# Patient Record
Sex: FEMALE | Race: WHITE | NOT HISPANIC OR LATINO | URBAN - METROPOLITAN AREA
[De-identification: names, ages, dates, MRNs, and addresses within clinical notes are randomized per-mention and may not be internally consistent; named-entity substitution may affect disease eponyms.]

---

## 2017-05-14 ENCOUNTER — EMERGENCY (EMERGENCY)
Facility: HOSPITAL | Age: 82
LOS: 1 days | Discharge: LEFT BEFORE DISCHARGE | End: 2017-05-14
Attending: EMERGENCY MEDICINE | Admitting: EMERGENCY MEDICINE
Payer: MEDICARE

## 2017-05-14 VITALS
DIASTOLIC BLOOD PRESSURE: 55 MMHG | RESPIRATION RATE: 16 BRPM | OXYGEN SATURATION: 98 % | HEART RATE: 61 BPM | SYSTOLIC BLOOD PRESSURE: 114 MMHG | TEMPERATURE: 97 F

## 2017-05-14 VITALS
SYSTOLIC BLOOD PRESSURE: 142 MMHG | OXYGEN SATURATION: 100 % | HEART RATE: 64 BPM | TEMPERATURE: 97 F | RESPIRATION RATE: 18 BRPM | DIASTOLIC BLOOD PRESSURE: 70 MMHG

## 2017-05-14 LAB
ALBUMIN SERPL ELPH-MCNC: 3.3 G/DL — SIGNIFICANT CHANGE UP (ref 3.3–5)
ALP SERPL-CCNC: 61 U/L — SIGNIFICANT CHANGE UP (ref 40–120)
ALT FLD-CCNC: 28 U/L — SIGNIFICANT CHANGE UP (ref 4–33)
AST SERPL-CCNC: 40 U/L — HIGH (ref 4–32)
BASOPHILS # BLD AUTO: 0 K/UL — SIGNIFICANT CHANGE UP (ref 0–0.2)
BASOPHILS NFR BLD AUTO: 0 % — SIGNIFICANT CHANGE UP (ref 0–2)
BILIRUB SERPL-MCNC: < 0.2 MG/DL — LOW (ref 0.2–1.2)
BUN SERPL-MCNC: 18 MG/DL — SIGNIFICANT CHANGE UP (ref 7–23)
CALCIUM SERPL-MCNC: 7.4 MG/DL — LOW (ref 8.4–10.5)
CHLORIDE SERPL-SCNC: 111 MMOL/L — HIGH (ref 98–107)
CO2 SERPL-SCNC: 17 MMOL/L — LOW (ref 22–31)
CREAT SERPL-MCNC: 0.92 MG/DL — SIGNIFICANT CHANGE UP (ref 0.5–1.3)
EOSINOPHIL # BLD AUTO: 0.06 K/UL — SIGNIFICANT CHANGE UP (ref 0–0.5)
EOSINOPHIL NFR BLD AUTO: 1 % — SIGNIFICANT CHANGE UP (ref 0–6)
GLUCOSE SERPL-MCNC: 115 MG/DL — HIGH (ref 70–99)
HCT VFR BLD CALC: 39 % — SIGNIFICANT CHANGE UP (ref 34.5–45)
HGB BLD-MCNC: 12.7 G/DL — SIGNIFICANT CHANGE UP (ref 11.5–15.5)
IMM GRANULOCYTES NFR BLD AUTO: 0.2 % — SIGNIFICANT CHANGE UP (ref 0–1.5)
INR BLD: 0.98 — SIGNIFICANT CHANGE UP (ref 0.88–1.17)
LYMPHOCYTES # BLD AUTO: 1.16 K/UL — SIGNIFICANT CHANGE UP (ref 1–3.3)
LYMPHOCYTES # BLD AUTO: 19.2 % — SIGNIFICANT CHANGE UP (ref 13–44)
MCHC RBC-ENTMCNC: 28.9 PG — SIGNIFICANT CHANGE UP (ref 27–34)
MCHC RBC-ENTMCNC: 32.6 % — SIGNIFICANT CHANGE UP (ref 32–36)
MCV RBC AUTO: 88.8 FL — SIGNIFICANT CHANGE UP (ref 80–100)
MONOCYTES # BLD AUTO: 0.41 K/UL — SIGNIFICANT CHANGE UP (ref 0–0.9)
MONOCYTES NFR BLD AUTO: 6.8 % — SIGNIFICANT CHANGE UP (ref 2–14)
NEUTROPHILS # BLD AUTO: 4.39 K/UL — SIGNIFICANT CHANGE UP (ref 1.8–7.4)
NEUTROPHILS NFR BLD AUTO: 72.8 % — SIGNIFICANT CHANGE UP (ref 43–77)
PLATELET # BLD AUTO: 197 K/UL — SIGNIFICANT CHANGE UP (ref 150–400)
PMV BLD: 10.3 FL — SIGNIFICANT CHANGE UP (ref 7–13)
POTASSIUM SERPL-MCNC: 3.9 MMOL/L — SIGNIFICANT CHANGE UP (ref 3.5–5.3)
POTASSIUM SERPL-SCNC: 3.9 MMOL/L — SIGNIFICANT CHANGE UP (ref 3.5–5.3)
PROT SERPL-MCNC: 5.7 G/DL — LOW (ref 6–8.3)
PROTHROM AB SERPL-ACNC: 11 SEC — SIGNIFICANT CHANGE UP (ref 9.8–13.1)
RBC # BLD: 4.39 M/UL — SIGNIFICANT CHANGE UP (ref 3.8–5.2)
RBC # FLD: 13.7 % — SIGNIFICANT CHANGE UP (ref 10.3–14.5)
SODIUM SERPL-SCNC: 145 MMOL/L — SIGNIFICANT CHANGE UP (ref 135–145)
TROPONIN T SERPL-MCNC: < 0.06 NG/ML — SIGNIFICANT CHANGE UP (ref 0–0.06)
WBC # BLD: 6.03 K/UL — SIGNIFICANT CHANGE UP (ref 3.8–10.5)
WBC # FLD AUTO: 6.03 K/UL — SIGNIFICANT CHANGE UP (ref 3.8–10.5)

## 2017-05-14 PROCEDURE — 71020: CPT | Mod: 26

## 2017-05-14 PROCEDURE — 99285 EMERGENCY DEPT VISIT HI MDM: CPT

## 2017-05-14 NOTE — ED ADULT TRIAGE NOTE - CHIEF COMPLAINT QUOTE
Patient brought in by EMS  from a friends house. Was having dinner when she felt diaphoretic. LOC for approximately 5 minutes. Patient brought down to ground by family. No injuries obtained and did not hit head. Hypotensive on scene (83/51). FS- 241 18G IV lock to left forearm- received 1200cc NS. PMH- hld, hypothyroid, htn, colitis, CVA (no residual), osteoporosis

## 2017-05-14 NOTE — ED PROVIDER NOTE - MEDICAL DECISION MAKING DETAILS
88 yo F w/ hx CVA on plavix and ASA, HLD, HTN, BIBEMS s/p witnessed syncopal episode. Patient finished eating and felt diaphoretic, was taken to a couch, dropped backwards and slumped on couch, LOC for approximately 5 minutes. Family denied any shaking, urinary incontinence, no head injury. Patient brought down to ground by family. EMS found patient to be Hypotensive on scene (83/51). FS- 241 18G IV lock to left forearm- received 1200cc NS. Pt is not back to baseline. Denies any symptoms.  - Labs, chest x-ray, EKG

## 2017-05-14 NOTE — ED PROVIDER NOTE - ATTENDING CONTRIBUTION TO CARE
87F p/w episode of passing out at home after eating dinner - felt diaphoretic, was led to couch at which time slumped over and LOC x 5min, no shaking or B/B incontinence.  USOH today.  Found to be hypotensive on scene, rx'ed IVF by EMS, now back to baseline, feeling better.  VS:  unremarkable    GEN - NAD; well appearing; A+O x3   HEAD - NC/AT     ENT - PEERL, EOMI, mucous membranes  moist , no discharge      NECK: Neck supple, non-tender without lymphadenopathy, no masses, no JVD  PULM - CTA b/l,  symmetric breath sounds  COR -  normal heart sounds    ABD - , ND, NT, soft, no guarding, no rebound, no masses    BACK - no CVA tenderness, nontender spine     EXTREMS - no edema, no deformity, warm and well perfused    SKIN - no rash or bruising      NEUROLOGIC - alert, CN 2-12 intact, sensation nl, motor 5/5 RUE/LUE/RLE/LLE.      IMP:  87F p/w syncope, found to be hypotensive on scene, now impv with IVF.  No complaints at present, no findings on exam.  Given age and mildly abnormal EKG would admit, however pt adamantly refusing to stay, understands she may drop dead of an arrhythmia, does not want to stay, will follow up with PMD.  Has capacity to make this decision.  Not interested in waiting for a second set of CE/rpt EKG.  Left in care of 2 family members.

## 2017-05-14 NOTE — ED ADULT NURSE NOTE - OBJECTIVE STATEMENT
Alert and oriented x 4. Pt received to spot 22 complaining of syncopal episode. Pt states she feels fine. As per son pt was getting up to stand and fell out onto the couch. Pt denies chest pain, shortness of breath, nausea, vomiting or dizziness. Pt is sinus rhythm on cardiac monitor. Pt ambulates.  IV 18g to left arm from EMS. Labs drawn. VSS. Will continue to monitor. RN facilitator.

## 2017-05-14 NOTE — ED PROVIDER NOTE - PROGRESS NOTE DETAILS
PMD Dr. López [phone], [phone]. was contacted Pt was explained the risk of leaving the hospital after a syncopal episode with an abnormal EKG without a cardiac work-up. Pt is able to make decision for herself and decided to leave. Pt was given lab results to follow up with PMD.

## 2017-05-14 NOTE — ED PROVIDER NOTE - OBJECTIVE STATEMENT
86 yo F w/ hx CVA on plavix and ASA, HLD, HTN, BIBEMS s/p witnessed syncopal episode. Patient finished eating and felt diaphoretic, was taken to a couch, dropped backwards and slumped on couch, LOC for approximately 5 minutes. Family denied any shaking, urinary incontinence, no head injury. Patient brought down to ground by family. EMS found patient to be Hypotensive on scene (83/51). FS- 241 18G IV lock to left forearm- received 1200cc NS. Pt is not back to baseline. Denies any symptoms.

## 2017-06-26 VITALS — WEIGHT: 114 LBS | BODY MASS INDEX: 24.59 KG/M2 | HEIGHT: 57 IN

## 2017-06-26 PROBLEM — R55 SYNCOPE, NEAR: Status: ACTIVE | Noted: 2017-06-26

## 2017-06-26 PROBLEM — K57.90 DIVERTICULOSIS: Status: ACTIVE | Noted: 2017-06-26

## 2017-06-26 PROBLEM — I10 HYPERTENSION, BENIGN: Status: ACTIVE | Noted: 2017-06-26

## 2017-06-26 PROBLEM — E03.9 HYPOTHYROIDISM, ADULT: Status: ACTIVE | Noted: 2017-06-26

## 2017-06-26 PROBLEM — R06.09 DYSPNEA ON EXERTION: Status: ACTIVE | Noted: 2017-06-26

## 2017-06-26 PROBLEM — I63.9 MINI STROKE: Status: RESOLVED | Noted: 2017-06-26 | Resolved: 2017-06-26

## 2017-06-26 PROBLEM — Z00.00 ENCOUNTER FOR PREVENTIVE HEALTH EXAMINATION: Status: ACTIVE | Noted: 2017-06-26

## 2017-06-26 PROBLEM — R73.03 PRE-DIABETES: Status: ACTIVE | Noted: 2017-06-26

## 2017-06-26 RX ORDER — VERAPAMIL HYDROCHLORIDE 240 MG/1
240 TABLET ORAL
Refills: 0 | Status: ACTIVE | COMMUNITY

## 2017-06-26 RX ORDER — CLOPIDOGREL 75 MG/1
75 TABLET, FILM COATED ORAL
Refills: 0 | Status: ACTIVE | COMMUNITY

## 2017-06-26 RX ORDER — PRAVASTATIN SODIUM 40 MG/1
40 TABLET ORAL
Refills: 0 | Status: ACTIVE | COMMUNITY

## 2017-06-28 ENCOUNTER — APPOINTMENT (OUTPATIENT)
Dept: CARDIOLOGY | Facility: CLINIC | Age: 82
End: 2017-06-28

## 2017-06-28 DIAGNOSIS — R06.09 OTHER FORMS OF DYSPNEA: ICD-10-CM

## 2017-06-28 DIAGNOSIS — K57.90 DIVERTICULOSIS OF INTESTINE, PART UNSPECIFIED, W/OUT PERFORATION OR ABSCESS W/OUT BLEEDING: ICD-10-CM

## 2017-06-28 DIAGNOSIS — R73.03 PREDIABETES.: ICD-10-CM

## 2017-06-28 DIAGNOSIS — I63.9 CEREBRAL INFARCTION, UNSPECIFIED: ICD-10-CM

## 2017-06-28 DIAGNOSIS — E03.9 HYPOTHYROIDISM, UNSPECIFIED: ICD-10-CM

## 2017-06-28 DIAGNOSIS — R55 SYNCOPE AND COLLAPSE: ICD-10-CM

## 2017-06-28 DIAGNOSIS — I10 ESSENTIAL (PRIMARY) HYPERTENSION: ICD-10-CM

## 2021-10-27 ENCOUNTER — INPATIENT (INPATIENT)
Facility: HOSPITAL | Age: 86
LOS: 1 days | Discharge: HOME | DRG: 312 | End: 2021-10-29
Attending: INTERNAL MEDICINE | Admitting: INTERNAL MEDICINE
Payer: MEDICARE

## 2021-10-27 VITALS
HEART RATE: 60 BPM | HEIGHT: 65 IN | WEIGHT: 110.01 LBS | TEMPERATURE: 98 F | DIASTOLIC BLOOD PRESSURE: 58 MMHG | RESPIRATION RATE: 16 BRPM | OXYGEN SATURATION: 98 % | SYSTOLIC BLOOD PRESSURE: 124 MMHG

## 2021-10-27 DIAGNOSIS — F03.90 UNSPECIFIED DEMENTIA WITHOUT BEHAVIORAL DISTURBANCE: ICD-10-CM

## 2021-10-27 DIAGNOSIS — E78.5 HYPERLIPIDEMIA, UNSPECIFIED: ICD-10-CM

## 2021-10-27 DIAGNOSIS — R55 SYNCOPE AND COLLAPSE: ICD-10-CM

## 2021-10-27 DIAGNOSIS — Z29.9 ENCOUNTER FOR PROPHYLACTIC MEASURES, UNSPECIFIED: ICD-10-CM

## 2021-10-27 DIAGNOSIS — I10 ESSENTIAL (PRIMARY) HYPERTENSION: ICD-10-CM

## 2021-10-27 DIAGNOSIS — E03.9 HYPOTHYROIDISM, UNSPECIFIED: ICD-10-CM

## 2021-10-27 DIAGNOSIS — I63.9 CEREBRAL INFARCTION, UNSPECIFIED: ICD-10-CM

## 2021-10-27 LAB
ALBUMIN SERPL ELPH-MCNC: 4.3 G/DL — SIGNIFICANT CHANGE UP (ref 3.3–5)
ALP SERPL-CCNC: 61 U/L — SIGNIFICANT CHANGE UP (ref 40–120)
ALT FLD-CCNC: 27 U/L — SIGNIFICANT CHANGE UP (ref 10–45)
ANION GAP SERPL CALC-SCNC: 15 MMOL/L — SIGNIFICANT CHANGE UP (ref 5–17)
APPEARANCE UR: CLEAR — SIGNIFICANT CHANGE UP
AST SERPL-CCNC: 36 U/L — SIGNIFICANT CHANGE UP (ref 10–40)
BACTERIA # UR AUTO: NEGATIVE — SIGNIFICANT CHANGE UP
BASOPHILS # BLD AUTO: 0.01 K/UL — SIGNIFICANT CHANGE UP (ref 0–0.2)
BASOPHILS NFR BLD AUTO: 0.2 % — SIGNIFICANT CHANGE UP (ref 0–2)
BILIRUB SERPL-MCNC: 0.3 MG/DL — SIGNIFICANT CHANGE UP (ref 0.2–1.2)
BILIRUB UR-MCNC: NEGATIVE — SIGNIFICANT CHANGE UP
BUN SERPL-MCNC: 22 MG/DL — SIGNIFICANT CHANGE UP (ref 7–23)
CALCIUM SERPL-MCNC: 9.7 MG/DL — SIGNIFICANT CHANGE UP (ref 8.4–10.5)
CHLORIDE SERPL-SCNC: 102 MMOL/L — SIGNIFICANT CHANGE UP (ref 96–108)
CO2 SERPL-SCNC: 24 MMOL/L — SIGNIFICANT CHANGE UP (ref 22–31)
COLOR SPEC: SIGNIFICANT CHANGE UP
CREAT SERPL-MCNC: 0.79 MG/DL — SIGNIFICANT CHANGE UP (ref 0.5–1.3)
DIFF PNL FLD: NEGATIVE — SIGNIFICANT CHANGE UP
EOSINOPHIL # BLD AUTO: 0.09 K/UL — SIGNIFICANT CHANGE UP (ref 0–0.5)
EOSINOPHIL NFR BLD AUTO: 1.5 % — SIGNIFICANT CHANGE UP (ref 0–6)
EPI CELLS # UR: 0 /HPF — SIGNIFICANT CHANGE UP
GLUCOSE SERPL-MCNC: 122 MG/DL — HIGH (ref 70–99)
GLUCOSE UR QL: NEGATIVE — SIGNIFICANT CHANGE UP
HCT VFR BLD CALC: 41.5 % — SIGNIFICANT CHANGE UP (ref 34.5–45)
HGB BLD-MCNC: 13.4 G/DL — SIGNIFICANT CHANGE UP (ref 11.5–15.5)
HYALINE CASTS # UR AUTO: 1 /LPF — SIGNIFICANT CHANGE UP (ref 0–2)
IMM GRANULOCYTES NFR BLD AUTO: 0.3 % — SIGNIFICANT CHANGE UP (ref 0–1.5)
KETONES UR-MCNC: NEGATIVE — SIGNIFICANT CHANGE UP
LEUKOCYTE ESTERASE UR-ACNC: NEGATIVE — SIGNIFICANT CHANGE UP
LYMPHOCYTES # BLD AUTO: 1.32 K/UL — SIGNIFICANT CHANGE UP (ref 1–3.3)
LYMPHOCYTES # BLD AUTO: 22.4 % — SIGNIFICANT CHANGE UP (ref 13–44)
MCHC RBC-ENTMCNC: 28 PG — SIGNIFICANT CHANGE UP (ref 27–34)
MCHC RBC-ENTMCNC: 32.3 GM/DL — SIGNIFICANT CHANGE UP (ref 32–36)
MCV RBC AUTO: 86.8 FL — SIGNIFICANT CHANGE UP (ref 80–100)
MONOCYTES # BLD AUTO: 0.53 K/UL — SIGNIFICANT CHANGE UP (ref 0–0.9)
MONOCYTES NFR BLD AUTO: 9 % — SIGNIFICANT CHANGE UP (ref 2–14)
NEUTROPHILS # BLD AUTO: 3.93 K/UL — SIGNIFICANT CHANGE UP (ref 1.8–7.4)
NEUTROPHILS NFR BLD AUTO: 66.6 % — SIGNIFICANT CHANGE UP (ref 43–77)
NITRITE UR-MCNC: NEGATIVE — SIGNIFICANT CHANGE UP
NRBC # BLD: 0 /100 WBCS — SIGNIFICANT CHANGE UP (ref 0–0)
PH UR: 6 — SIGNIFICANT CHANGE UP (ref 5–8)
PLATELET # BLD AUTO: 221 K/UL — SIGNIFICANT CHANGE UP (ref 150–400)
POTASSIUM SERPL-MCNC: 4.4 MMOL/L — SIGNIFICANT CHANGE UP (ref 3.5–5.3)
POTASSIUM SERPL-SCNC: 4.4 MMOL/L — SIGNIFICANT CHANGE UP (ref 3.5–5.3)
PROT SERPL-MCNC: 6.9 G/DL — SIGNIFICANT CHANGE UP (ref 6–8.3)
PROT UR-MCNC: SIGNIFICANT CHANGE UP
RBC # BLD: 4.78 M/UL — SIGNIFICANT CHANGE UP (ref 3.8–5.2)
RBC # FLD: 14.7 % — HIGH (ref 10.3–14.5)
RBC CASTS # UR COMP ASSIST: 1 /HPF — SIGNIFICANT CHANGE UP (ref 0–4)
SARS-COV-2 RNA SPEC QL NAA+PROBE: SIGNIFICANT CHANGE UP
SODIUM SERPL-SCNC: 141 MMOL/L — SIGNIFICANT CHANGE UP (ref 135–145)
SP GR SPEC: 1.02 — SIGNIFICANT CHANGE UP (ref 1.01–1.02)
UROBILINOGEN FLD QL: NEGATIVE — SIGNIFICANT CHANGE UP
WBC # BLD: 5.9 K/UL — SIGNIFICANT CHANGE UP (ref 3.8–10.5)
WBC # FLD AUTO: 5.9 K/UL — SIGNIFICANT CHANGE UP (ref 3.8–10.5)
WBC UR QL: 1 /HPF — SIGNIFICANT CHANGE UP (ref 0–5)

## 2021-10-27 PROCEDURE — 99223 1ST HOSP IP/OBS HIGH 75: CPT

## 2021-10-27 PROCEDURE — 99285 EMERGENCY DEPT VISIT HI MDM: CPT | Mod: CS

## 2021-10-27 PROCEDURE — 70450 CT HEAD/BRAIN W/O DYE: CPT | Mod: 26,MG

## 2021-10-27 PROCEDURE — G1004: CPT

## 2021-10-27 PROCEDURE — 93010 ELECTROCARDIOGRAM REPORT: CPT

## 2021-10-27 RX ORDER — ACETAMINOPHEN 500 MG
650 TABLET ORAL EVERY 6 HOURS
Refills: 0 | Status: DISCONTINUED | OUTPATIENT
Start: 2021-10-27 | End: 2021-10-29

## 2021-10-27 RX ORDER — CLOPIDOGREL BISULFATE 75 MG/1
75 TABLET, FILM COATED ORAL DAILY
Refills: 0 | Status: DISCONTINUED | OUTPATIENT
Start: 2021-10-27 | End: 2021-10-29

## 2021-10-27 RX ORDER — LEVOTHYROXINE SODIUM 125 MCG
50 TABLET ORAL DAILY
Refills: 0 | Status: DISCONTINUED | OUTPATIENT
Start: 2021-10-27 | End: 2021-10-29

## 2021-10-27 RX ORDER — ENOXAPARIN SODIUM 100 MG/ML
40 INJECTION SUBCUTANEOUS DAILY
Refills: 0 | Status: DISCONTINUED | OUTPATIENT
Start: 2021-10-27 | End: 2021-10-29

## 2021-10-27 RX ORDER — LABETALOL HCL 100 MG
10 TABLET ORAL
Refills: 0 | Status: DISCONTINUED | OUTPATIENT
Start: 2021-10-27 | End: 2021-10-29

## 2021-10-27 RX ORDER — LISINOPRIL 2.5 MG/1
40 TABLET ORAL DAILY
Refills: 0 | Status: DISCONTINUED | OUTPATIENT
Start: 2021-10-27 | End: 2021-10-29

## 2021-10-27 RX ORDER — ONDANSETRON 8 MG/1
4 TABLET, FILM COATED ORAL EVERY 8 HOURS
Refills: 0 | Status: DISCONTINUED | OUTPATIENT
Start: 2021-10-27 | End: 2021-10-29

## 2021-10-27 RX ORDER — ASPIRIN/CALCIUM CARB/MAGNESIUM 324 MG
81 TABLET ORAL DAILY
Refills: 0 | Status: DISCONTINUED | OUTPATIENT
Start: 2021-10-27 | End: 2021-10-29

## 2021-10-27 RX ORDER — ATORVASTATIN CALCIUM 80 MG/1
40 TABLET, FILM COATED ORAL AT BEDTIME
Refills: 0 | Status: DISCONTINUED | OUTPATIENT
Start: 2021-10-27 | End: 2021-10-29

## 2021-10-27 NOTE — ED PROVIDER NOTE - PROGRESS NOTE DETAILS
Patient seen at bedside in NAD.  VSS.  Patient resting comfortably without complaints. Labs unremarkable.  Discussed with Dr. Mcnulty, requesting admission to Dr. Grubbs.  Neuro consulted, will see patient.  -Ventura Saucedo PA-C

## 2021-10-27 NOTE — CONSULT NOTE ADULT - ASSESSMENT
NIHSS:    Baseline MRS:     CT head showed: Subacute infarct involving nearly the entire R cerebellar hemisphere.      Impression: symptoms 2/2 occlusion    Mechanism:      Recommendations: INCOMPLETE  [] orthostatics  [] Aspirin 81mg PO, plavix 75mg PO   [] Atorvastatin 40-80 mg daily (long-term goal and titrate to LDL < 70)  [] HgbA1C, fasting lipid panel, CBC, CMP, coag panel, troponin   [] TTE w/ bubble study  [] telemetry to check for arrhythmia, EKG,    [] rest of cardiac w/u for syncope     - Tight glucose control (long-term goal HgbA1c < 6%)  - Stroke education and counseling  - Neuro-checks with VS q4h  - Dysphagia screen if required  - PT/ OT / CM/ SW evaluations  - DVT ppx: Lovenox 40 mg Sq vs heparin subq unless contraindicated in which case SCDs     To be discussed with neurology attending. Will be formally staffed on morning rounds with attending. Recommendations will be complete once signed by attending. Patient is a 89 yo F with PMHx of Dementia, HTN, HLD, recently presented to [facility] for nausea, vomiting, dizziness, and being found down found with R cerebellar stroke in R PICA distribution. Discharged on aspirin/plavix who presents with brief episode <1 min of becoming nonresponding to family while sitting that appeared to be concerning for ?syncope. Vs 97.8, HR60, 124/58, RR16, 98%RA.     NIHSS:  1   Baseline MRS:  1    CT head at [facility] 10/27: Subacute infarct involving nearly the entire R cerebellar hemisphere.      Recent workup at [facility]:  - TTE 10/25: mild LV hypertrophy, R atrium normal size, LVEf 60%, trace MR, trace AR, mild TR. Otherwise unremarkable for cause of stroke  - Carotid duplex 10/25: 0-19% stenosis of DENY, LICA. Antegrade flow of R/L verts.   - MR brain: acute R cerebellar infarct in distribution of R PICA territory. No other acute infarct. No intracranial hemorrhage. Does have old lacunar infarcts and chronic small vessel disease. MRA anterior circulation was not well visualized 2/2 motion.     Impression: Patient with recent large subacute infarct of R cerebellar hemisphere presents with acute brief change in responsiveness without any tonic/clonic activity noted or signs of seizure. Can also consider acute brief vertigo or syncope as other potential etiologies. No signs of infectious toxic or metabolic causes.      Mechanism: small vessel disease vs ESUS     Recommendations:   [ ] orthostatics  [x] Aspirin 81mg PO, plavix 75mg PO continue for 3 weeks followed by aspirin 81mg alone   [x] Atorvastatin 40-80 mg daily (long-term goal and titrate to LDL < 70)  [ ] HgbA1C, fasting lipid panel, CBC, CMP, coag panel, troponin   [x] TTE w/ bubble study  [ ] telemetry to check for arrhythmia, EKG,    [ ] rest of cardiac w/u for syncope   [x] MRI no con (recently performed at [facility])  [ ] Can consider obtaining spot EEG if high clinical suspicion. Patient at risk for seizure given recent stroke but clinical history appears less suspicious for seizure event.    - Tight glucose control (long-term goal HgbA1c < 6%)  - Stroke education and counseling  - Neuro-checks with VS q4h  - Dysphagia screen if required  - PT/ OT / CM/ SW evaluations  - DVT ppx: Lovenox 40 mg Sq vs heparin subq unless contraindicated in which case SCDs     To be discussed with neurology attending. Will be formally staffed on morning rounds with attending. Recommendations will be complete once signed by attending.

## 2021-10-27 NOTE — ED ADULT NURSE NOTE - CHPI ED NUR SYMPTOMS NEG
no blurred vision/no change in level of consciousness/no fever/no nausea/no numbness/no vomiting/no weakness

## 2021-10-27 NOTE — CONSULT NOTE ADULT - SUBJECTIVE AND OBJECTIVE BOX
Neurology Consultation  Chuck Peralta, PGY-2        HPI: Patient is a 89 yo F with PMHx of Dementia, HTN, HLD, recent CVA (R PICA) p/w syncope. Patient does not remember exactly what brought her to the ER.  Per patient's son, patient was following up with her cardiologist for follow up after having a CVA last week.  While sitting on the examination chair, patient became unresponsive for <1 min.  Witnessed by son.  Patient did not fall or hit her head. Was back to baseline within a minute or two.  No seizure like activity, urinary/fecal incontinence, tongue biting.  Patient denies fevers/chills, CP, SOB, abd pain, NVD.  Patient's son states that the patient had been c/o headache and dizziness since being discharged after her CVA.  PER DC paperwork from [facility], patient had a TTE showing EF of 60% and mild aortic regurg, otherwise unremarkable.     Cardiology: Dr. karly Mcnulty     Otherwise denies fever, chills, headaches, vision changes, blurry vision, double vision, nausea, vomiting, hearing change, focal weakness, focal numbness, parasthesias, bowel/ bladder incontinence.      (Stroke only)  NIHSS:   MRS:      PAST MEDICAL & SURGICAL HISTORY:  CVA (cerebrovascular accident)  2006    Cerebellar infarct  right side/10/2021    Cerebellar infarct    HTN (hypertension)    Hypothyroid    High cholesterol    Cataracts, bilateral  with surgery    FAMILY HISTORY:     SOCIAL HISTORY: no smoking, alcohol, drug use hx    MEDICATIONS (HOME):  Home Medications:  aspirin 81 mg oral tablet, chewable: 1 tab(s) orally once a day (27 Oct 2021 20:40)  atorvastatin 40 mg oral tablet: 1 tab(s) orally once a day (27 Oct 2021 20:40)  clopidogrel 75 mg oral tablet: 1 tab(s) orally once a day (27 Oct 2021 20:40)  Fish Oil: 1 cap(s) orally once a day (27 Oct 2021 20:40)  potassium otc: 1 tab(s) orally once a day (27 Oct 2021 20:40)  PreserVision AREDS 2 oral capsule: 1 cap(s) orally once a day (27 Oct 2021 20:40)  quinapril 40 mg oral tablet: 1 tab(s) orally 2 times a day (27 Oct 2021 20:40)  Synthroid 50 mcg (0.05 mg) oral tablet: 1 tab(s) orally once a day (27 Oct 2021 20:40)  verapamil 240 mg/12 hours oral tablet, extended release: 1 tab(s) orally 2 times a day (27 Oct 2021 20:40)    MEDICATIONS  (STANDING):  enoxaparin Injectable 40 milliGRAM(s) SubCutaneous daily    MEDICATIONS  (PRN):  acetaminophen     Tablet .. 650 milliGRAM(s) Oral every 6 hours PRN Temp greater or equal to 38C (100.4F), Mild Pain (1 - 3)  aluminum hydroxide/magnesium hydroxide/simethicone Suspension 30 milliLiter(s) Oral every 4 hours PRN Dyspepsia  ondansetron Injectable 4 milliGRAM(s) IV Push every 8 hours PRN Nausea and/or Vomiting    ALLERGIES/INTOLERANCES:  Allergies  lidocaine (Unknown)    Intolerances    VITALS & EXAMINATION:  Vital Signs Last 24 Hrs  T(C): 36.9 (27 Oct 2021 19:57), Max: 36.9 (27 Oct 2021 19:57)  T(F): 98.4 (27 Oct 2021 19:57), Max: 98.4 (27 Oct 2021 19:57)  HR: 66 (27 Oct 2021 19:57) (60 - 66)  BP: 164/61 (27 Oct 2021 19:57) (124/58 - 164/61)  BP(mean): --  RR: 18 (27 Oct 2021 19:57) (16 - 18)  SpO2: 99% (27 Oct 2021 19:57) (98% - 99%)    General:  Constitutional: Female, appears stated age, in no apparent distress including pain  Head: Normocephalic & atraumatic; Eyes: clear sclera; Neck: supple  Extremities: No cyanosis, clubbing, or edema; Skin: No rashes, bruising, or discoloration.  Resp: breathing comfortably, normal rate    Neurological (>12):  MS: Awake, alert, oriented to person, place, situation, time. Normal affect. Follows all commands. Cooperative.   Language: Speech is clear, fluent, intact with normal tone/rate/ volume and good repetition,  comprehension, registration of words. No perseverance.     CNs: PERRL (R = 3mm, L = 3mm). VFF. EOMI no nystagmus. V1-3 intact to LT, well developed masseter muscles b/l. No facial asymmetry b/l, full eye closure strength b/l. Hearing grossly normal (rubbing fingers) b/l.  Gag reflex deferred.     Motor: Normal muscle bulk & tone. No noticeable tremor or seizure. No pronator drift.              Deltoid	Biceps	Triceps	   R	5	5	5	5		 	  L	5	5	5	5			  	H-Flex	K-Ext	D-Flex	P-Flex  R	5	5	5	5			 	   L	5	5	5	5		     Sensation: Intact to LT/PP/Temp/Vibration/Position b/l., Cortical: Extinction on DSS (neglect): none  Reflexes:              Biceps(C5)       BR(C6)     Triceps(C7)               Patellar(L4)        Plantar Resp  R	2	          2	             2		        2		    	Down   L	2	          2	             2		        2		 	Down     Coordination: No dysmetria to FTN  Gait: Normal Romberg. No postural instability. Normal stance and tandem gait.     LABORATORY:  CBC                       13.4   5.90  )-----------( 221      ( 27 Oct 2021 15:42 )             41.5     Chem 10    141  |  102  |  22  ----------------------------<  122<H>  4.4   |  24  |  0.79    Ca    9.7      27 Oct 2021 15:42    TPro  6.9  /  Alb  4.3  /  TBili  0.3  /  DBili  x   /  AST  36  /  ALT  27  /  AlkPhos  61  10-27    LFTs LIVER FUNCTIONS - ( 27 Oct 2021 15:42 )  Alb: 4.3 g/dL / Pro: 6.9 g/dL / ALK PHOS: 61 U/L / ALT: 27 U/L / AST: 36 U/L / GGT: x           Coagulopathy   Lipid Panel   A1c   Cardiac enzymes     U/A Urinalysis Basic - ( 27 Oct 2021 19:00 )    Color: Light Yellow / Appearance: Clear / S.018 / pH: x  Gluc: x / Ketone: Negative  / Bili: Negative / Urobili: Negative   Blood: x / Protein: Trace / Nitrite: Negative   Leuk Esterase: Negative / RBC: 1 /hpf / WBC 1 /HPF   Sq Epi: x / Non Sq Epi: 0 /hpf / Bacteria: Negative      CSF  Other    STUDIES & IMAGING: (EEG, CT, MR, U/S, TTE/VENESSA):    < from: CT Head No Cont (10.27.21 @ 16:39) >    EXAM:  CT BRAIN                            PROCEDURE DATE:  10/27/2021            INTERPRETATION:  CT HEAD  HEAD CT    INDICATIONS: syncope, hx of HTN, high chol, CVA, recent cerebellar infarct (history of right PICA infarct 1 week ago) sent in from PCP office where she was today after being DC'd from [facility] yesterday, now w syncopal episode. Pt with dementia and unable to provide hx. Son at bedside states she was seated on exam table hooked up to Holter, suddenly felt warm and then went unresponsive looking off to the side, was cool and clammy and unresponsive for about a minute. No incontinence. Pt now at baseline. Has been c/o mild HA and dizziness which has been constant since her recent hospitalization.    TECHNIQUE:    HEAD CT:  Serialaxial images were obtained from the skull base to the vertex without the use of intravenous contrast.    COMPARISON EXAMINATION: None.    FINDINGS:    HEAD CT:    VENTRICLES AND SULCI: Ventricles and sulci are unremarkable for patient age.  INTRA-AXIAL: No intracranial mass, acute hemorrhage, or midline shift is present. There is non-specific decreased attenuation in the white matter likely related to sequelae of microvascular disease. Large area of subacute appearing infarct in nearly the entireright cerebellar hemisphere.  EXTRA-AXIAL: No extra-axial fluid collection is present.  INTRACRANIAL HEMORRHAGE: None.    VISUALIZED SINUSES: No air-fluid levels are identified.  VISUALIZED MASTOIDS: Scattered opacifications in the right mastoid air cells.  CALVARIUM:  Intact.  MISCELLANEOUS:  None.    SOFT TISSUES: Unremarkable.  BONES: Unremarkable.      IMPRESSION:    HEAD CT: Mild volume loss, microvascular disease, no acute hemorrhage or midline shift.  Subacute infarct involving nearly the entire right cerebellar hemisphere. No prior exam available for comparison.    --- End of Report ---    OLIVIER CAMPOS MD; Attending Radiologist  This document has been electronically signed. Oct 27 2021  4:48PM    < end of copied text >   Neurology Consultation  Chuck Peralta, PGY-2      HPI: Patient is a 89 yo F with PMHx of Dementia, HTN, HLD, recent CVA (R PICA) on aspirin/plavix presents with syncope. Patient accompanied by son who states patient recently went to [facility] on saturday as she was found passed out, with nausea, vomiting, dizziness. At [facility] found to have acute infarct R inferior cerebellum (R PICA distribution). Was discharged and went to see PCP, but was found to be staring, being unresponsive briefly for <1min. No reported tonic/ clonic activity, urinary incontinence or tongue biting. Has since returned to baseline. Son brought [facility] paperwork with comprehensive stroke workup already performed. Does not follow a stroke neurologist. Currently on aspirin/ plavix. NIHSS at [facility] was 2, pre mrs 1. exam was AOx1, otherwise unremarkable except for gait instability. Denies current fever, chills, headaches, vision changes, nausea, vomiting, hearing change, focal weakness, focal numbness. Appears family did not want to take her to rehab. Stays with other son.     (Stroke only)  NIHSS: 1 (subtle ataxia of RUE)  MRS: 1      PAST MEDICAL & SURGICAL HISTORY:  CVA (cerebrovascular accident)      Cerebellar infarct  right side/10/2021    Cerebellar infarct    HTN (hypertension)    Hypothyroid    High cholesterol    Cataracts, bilateral  with surgery    SOCIAL HISTORY: no smoking, alcohol, drug use hx    MEDICATIONS (HOME):  Home Medications:  aspirin 81 mg oral tablet, chewable: 1 tab(s) orally once a day (27 Oct 2021 20:40)  atorvastatin 40 mg oral tablet: 1 tab(s) orally once a day (27 Oct 2021 20:40)  clopidogrel 75 mg oral tablet: 1 tab(s) orally once a day (27 Oct 2021 20:40)  Fish Oil: 1 cap(s) orally once a day (27 Oct 2021 20:40)  potassium otc: 1 tab(s) orally once a day (27 Oct 2021 20:40)  PreserVision AREDS 2 oral capsule: 1 cap(s) orally once a day (27 Oct 2021 20:40)  quinapril 40 mg oral tablet: 1 tab(s) orally 2 times a day (27 Oct 2021 20:40)  Synthroid 50 mcg (0.05 mg) oral tablet: 1 tab(s) orally once a day (27 Oct 2021 20:40)  verapamil 240 mg/12 hours oral tablet, extended release: 1 tab(s) orally 2 times a day (27 Oct 2021 20:40)    MEDICATIONS  (STANDING):  enoxaparin Injectable 40 milliGRAM(s) SubCutaneous daily    MEDICATIONS  (PRN):  acetaminophen     Tablet .. 650 milliGRAM(s) Oral every 6 hours PRN Temp greater or equal to 38C (100.4F), Mild Pain (1 - 3)  aluminum hydroxide/magnesium hydroxide/simethicone Suspension 30 milliLiter(s) Oral every 4 hours PRN Dyspepsia  ondansetron Injectable 4 milliGRAM(s) IV Push every 8 hours PRN Nausea and/or Vomiting    ALLERGIES/INTOLERANCES:  Allergies  lidocaine (Unknown)    Intolerances    VITALS & EXAMINATION:  Vital Signs Last 24 Hrs  T(C): 36.9 (27 Oct 2021 19:57), Max: 36.9 (27 Oct 2021 19:57)  T(F): 98.4 (27 Oct 2021 19:57), Max: 98.4 (27 Oct 2021 19:57)  HR: 66 (27 Oct 2021 19:57) (60 - 66)  BP: 164/61 (27 Oct 2021 19:57) (124/58 - 164/61)  BP(mean): --  RR: 18 (27 Oct 2021 19:57) (16 - 18)  SpO2: 99% (27 Oct 2021 19:57) (98% - 99%)    General:  Constitutional: Female, appears stated age, in no apparent distress including pain  Head: Normocephalic; Eyes: clear sclera; Neck: supple  Extremities: No cyanosis, clubbing, or edema   Resp: breathing comfortably, normal rate    Neurological (>12):  MS: Awake, alert, oriented to person, place, situation, not time. Normal affect. Follows all commands. Cooperative.   Language: Speech is clear, fluent, intact with normal tone/rate/ volume and good repetition,  comprehension, registration of words.   CNs: PERRL (R = 3mm, L = 3mm). VFF. EOMI no nystagmus. V1-3 intact to LT, well developed masseter muscles b/l. No marked facial asymmetry b/l, full eye closure strength b/l. Hearing grossly normal (rubbing fingers) b/l.  Gag reflex deferred.     Motor: Normal muscle bulk & tone. No noticeable tremor or seizure. No pronator drift.              Deltoid	Biceps	Triceps	   R	5	5	5	5		 	  L	5	5	5	5			  	H-Flex	K-Ext	D-Flex	P-Flex  R	5	5	5	5			 	   L	5	5	5	5		     Sensation: Intact to LT  b/l., Cortical: Extinction on DSS (neglect): none     Coordination: very subtle dysmetria of upper extremity      LABORATORY:  CBC                       13.4   5.90  )-----------( 221      ( 27 Oct 2021 15:42 )             41.5     Chem 10-27    141  |  102  |  22  ----------------------------<  122<H>  4.4   |  24  |  0.79    Ca    9.7      27 Oct 2021 15:42    TPro  6.9  /  Alb  4.3  /  TBili  0.3  /  DBili  x   /  AST  36  /  ALT    /  AlkPhos  61  10-27    LFTs LIVER FUNCTIONS - ( 27 Oct 2021 15:42 )  Alb: 4.3 g/dL / Pro: 6.9 g/dL / ALK PHOS: 61 U/L / ALT: 27 U/L / AST: 36 U/L / GGT: x           Coagulopathy   Lipid Panel   A1c   Cardiac enzymes     U/A Urinalysis Basic - ( 27 Oct 2021 19:00 )    Color: Light Yellow / Appearance: Clear / S.018 / pH: x  Gluc: x / Ketone: Negative  / Bili: Negative / Urobili: Negative   Blood: x / Protein: Trace / Nitrite: Negative   Leuk Esterase: Negative / RBC: 1 /hpf / WBC 1 /HPF   Sq Epi: x / Non Sq Epi: 0 /hpf / Bacteria: Negative    CSF  Other    STUDIES & IMAGING: (EEG, CT, MR, U/S, TTE/VENESSA):    < from: CT Head No Cont (10.27.21 @ 16:39) >    EXAM:  CT BRAIN                          PROCEDURE DATE:  10/27/2021    INTERPRETATION:  CT HEAD  HEAD CT    INDICATIONS: syncope, hx of HTN, high chol, CVA, recent cerebellar infarct (history of right PICA infarct 1 week ago) sent in from PCP office where she was today after being DC'd from [place] yesterday, now w syncopal episode. Pt with dementia and unable to provide hx. Son at bedside states she was seated on exam table hooked up to Holter, suddenly felt warm and then went unresponsive looking off to the side, was cool and clammy and unresponsive for about a minute. No incontinence. Pt now at baseline. Has been c/o mild HA and dizziness which has been constant since her recent hospitalization.    TECHNIQUE:    HEAD CT:  Serialaxial images were obtained from the skull base to the vertex without the use of intravenous contrast.    COMPARISON EXAMINATION: None.    FINDINGS:    HEAD CT:    VENTRICLES AND SULCI: Ventricles and sulci are unremarkable for patient age.  INTRA-AXIAL: No intracranial mass, acute hemorrhage, or midline shift is present. There is non-specific decreased attenuation in the white matter likely related to sequelae of microvascular disease. Large area of subacute appearing infarct in nearly the entireright cerebellar hemisphere.  EXTRA-AXIAL: No extra-axial fluid collection is present.  INTRACRANIAL HEMORRHAGE: None.    VISUALIZED SINUSES: No air-fluid levels are identified.  VISUALIZED MASTOIDS: Scattered opacifications in the right mastoid air cells.  CALVARIUM:  Intact.  MISCELLANEOUS:  None.    SOFT TISSUES: Unremarkable.  BONES: Unremarkable.    IMPRESSION:    HEAD CT: Mild volume loss, microvascular disease, no acute hemorrhage or midline shift.  Subacute infarct involving nearly the entire right cerebellar hemisphere. No prior exam available for comparison.    --- End of Report ---    OLIVIER CAMPOS MD; Attending Radiologist  This document has been electronically signed. Oct 27 2021  4:48PM    < end of copied text >

## 2021-10-27 NOTE — H&P ADULT - NSHPPHYSICALEXAM_GEN_ALL_CORE
Vital Signs Last 24 Hrs  T(C): 36.9 (27 Oct 2021 19:57), Max: 36.9 (27 Oct 2021 19:57)  T(F): 98.4 (27 Oct 2021 19:57), Max: 98.4 (27 Oct 2021 19:57)  HR: 66 (27 Oct 2021 19:57) (60 - 66)  BP: 164/61 (27 Oct 2021 19:57) (124/58 - 164/61)  BP(mean): --  RR: 18 (27 Oct 2021 19:57) (16 - 18)  SpO2: 99% (27 Oct 2021 19:57) (98% - 99%)

## 2021-10-27 NOTE — H&P ADULT - ASSESSMENT
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 Admit for syncope w/u vs seizure activity

## 2021-10-27 NOTE — ED ADULT NURSE NOTE - NSICDXPASTMEDICALHX_GEN_ALL_CORE_FT
PAST MEDICAL HISTORY:  Cataracts, bilateral with surgery    Cerebellar infarct right side/10/2021    Cerebellar infarct     CVA (cerebrovascular accident) 2006    High cholesterol     HTN (hypertension)     Hypothyroid

## 2021-10-27 NOTE — ED ADULT NURSE NOTE - OBJECTIVE STATEMENT
Pt is a 89 yo F who was brought to the ED from Dr. Mcnulty's office via EMS c/o syncope. Pt saw Dr. Edwards today for F/U of rt cerebellar infarct 6 days ago; D/c from [facility] 3 days ago and has had intermittent episodes of mild headache and dizziness since. At Dr. Mcnulty's office today, pt was being fitted for a halter monitor and synopsized for approximately 30 seconds to one minute. Pt had her eyes open but was not responding. Pt does not recall incident. Denies ha/dizziness prior to syncope. No residual deficits from strokes. Son at bedside states pt is forgetful and confused, believes pt has undiagnosed dementia. Pt ambulates without assistance. Allergy to Lidocaine. A/O to person, place.

## 2021-10-27 NOTE — ED PROVIDER NOTE - ATTENDING CONTRIBUTION TO CARE
89F hx of HTN, high chol, CVA, recent cerebellar infarct sent in from PCP office where she was today after being DC'd from [facility] yesterday, now w syncopal episode. Pt with dementia and unable to provide hx. Son at bedside states she was seated on exam table hooked up to Holter, suddenly felt warm and then went unresponsive looking off to the side, was cool and clammy and unresponsive for about a minute. No incontinence. 89F hx of HTN, high chol, CVA, recent cerebellar infarct sent in from PCP office where she was today after being DC'd from [facility] yesterday, now w syncopal episode. Pt with dementia and unable to provide hx. Son at bedside states she was seated on exam table hooked up to Holter, suddenly felt warm and then went unresponsive looking off to the side, was cool and clammy and unresponsive for about a minute. No incontinence. Pt now at baseline. Has been c/o mild HA and dizziness which has been constant since her recent hospitalization. NO fevers. Current A&Ox2. CN intact. motor 5/5 throughout. SILT. RRR, no murmur, CTA BL. abd soft ntnd. post surgical pupils BL. EOMI. neck supple. Seizure vs syncope-neurogenic vs cardiac. Labs, CTH, EKG.

## 2021-10-27 NOTE — ED PROVIDER NOTE - PHYSICAL EXAMINATION
Gen: AAO x 2, NAD  Skin: No rashes or lesions  HEENT: NC/AT, PERRLA, EOMI, MMM  Resp: unlabored CTAB  Cardiac: rrr s1s2, no murmurs, rubs or gallops  GI: ND, +BS, Soft, NT  Ext: no pedal edema, FROM in all extremities  Neuro: no focal deficits.  Strength 5/5 BUE and BLE, sensation intact, mild right sided dysmetria.

## 2021-10-27 NOTE — H&P ADULT - NSHPLABSRESULTS_GEN_ALL_CORE
13.4   5.90  )-----------( 221      ( 27 Oct 2021 15:42 )             41.5       10-27    141  |  102  |  22  ----------------------------<  122<H>  4.4   |  24  |  0.79    Ca    9.7      27 Oct 2021 15:42    TPro  6.9  /  Alb  4.3  /  TBili  0.3  /  DBili  x   /  AST  36  /  ALT    /  AlkPhos  61  10-27      LIVER FUNCTIONS - ( 27 Oct 2021 15:42 )  Alb: 4.3 g/dL / Pro: 6.9 g/dL / ALK PHOS: 61 U/L / ALT: 27 U/L / AST: 36 U/L / GGT: x                 Urinalysis Basic - ( 27 Oct 2021 19:00 )    Color: Light Yellow / Appearance: Clear / S.018 / pH: x  Gluc: x / Ketone: Negative  / Bili: Negative / Urobili: Negative   Blood: x / Protein: Trace / Nitrite: Negative   Leuk Esterase: Negative / RBC: 1 /hpf / WBC 1 /HPF   Sq Epi: x / Non Sq Epi: 0 /hpf / Bacteria: Negative        I have personally reviewed imaging  CTH  IMPRESSION:  HEAD CT: Mild volume loss, microvascular disease, no acute hemorrhage or midline shift.  Subacute infarct involving nearly the entire right cerebellar hemisphere. No prior exam available for comparison.    I have personally reviewed EKG 13.4   5.90  )-----------( 221      ( 27 Oct 2021 15:42 )             41.5       10-27    141  |  102  |  22  ----------------------------<  122<H>  4.4   |  24  |  0.79    Ca    9.7      27 Oct 2021 15:42    TPro  6.9  /  Alb  4.3  /  TBili  0.3  /  DBili  x   /  AST  36  /  ALT    /  AlkPhos  61  10-27      LIVER FUNCTIONS - ( 27 Oct 2021 15:42 )  Alb: 4.3 g/dL / Pro: 6.9 g/dL / ALK PHOS: 61 U/L / ALT: 27 U/L / AST: 36 U/L / GGT: x                 Urinalysis Basic - ( 27 Oct 2021 19:00 )    Color: Light Yellow / Appearance: Clear / S.018 / pH: x  Gluc: x / Ketone: Negative  / Bili: Negative / Urobili: Negative   Blood: x / Protein: Trace / Nitrite: Negative   Leuk Esterase: Negative / RBC: 1 /hpf / WBC 1 /HPF   Sq Epi: x / Non Sq Epi: 0 /hpf / Bacteria: Negative        I have personally reviewed imaging  CTH  IMPRESSION:  HEAD CT: Mild volume loss, microvascular disease, no acute hemorrhage or midline shift.  Subacute infarct involving nearly the entire right cerebellar hemisphere. No prior exam available for comparison.    I have personally reviewed EKG  Sinus rhythm at 65bpm with 1st degree AV block; QTc-405

## 2021-10-27 NOTE — H&P ADULT - PROBLEM SELECTOR PLAN 4
-f/u lipid panel  -c/w -f/u lipid panel  -c/w atorvastatin 40mg for now and considering uptitrating if elevated lipids

## 2021-10-27 NOTE — H&P ADULT - PROBLEM SELECTOR PLAN 1
-likely cardiogenic in s/o 1st Degree AV block vs less likely neurologic  -CTH w/o e/o acute intracranial process; notable for subacute infarct of right cerebellar hemisphere  -f/u orthostats  -tele monitoring  -TTE to r/o structural heart disease  -avoid AV levon blocking agents -suspect possible seizure activity in s/o recent stroke vs cardiogenic in s/o baseline 1st Degree AV block  -CTH w/o e/o acute intracranial process; notable for subacute infarct of right cerebellar hemisphere  -f/u orthostats  -tele monitoring  -PER DC paperwork from [facility], patient had a TTE showing EF of 60% and mild aortic regurg, otherwise unremarkable.  -TTE w/ bubble study per neurology recs  -will d/c verapamil for now giving AV levon blocking activity  -EEG for seizure-like activity  -seizure precautions

## 2021-10-27 NOTE — H&P ADULT - HISTORY OF PRESENT ILLNESS
89F [facility] recent right-cerebellar CVA(10/2021), HTN, HLD, dementia presenting with LOC. History obtained from son (Pasha [phone]) as pt with baseline dementia. They were at her PCPs office for a post-hospital visit following her recent stroke when she was noted to have a blank stare while sitting up on the examination table with constricted pupils. She was not responsive and this episode lasted for about a minute followed by a slow return to baseline mentation. No reports of tongue-biting, generalized shaking or bladder/bowel incontinence. BP at the PMDs office was 118/58. He also reports since her return from the hospital for her recent stroke admission, she has complained of dizziness, and a HA in the back of her head. He brought a log of her BPs and she had readings as high as SBP-190s.No reports of chest pain, palpitations, SOB, abdominal pain, changes in BM, weakness or paresthesias, fall or head trauma.

## 2021-10-27 NOTE — ED PROVIDER NOTE - OBJECTIVE STATEMENT
89 yo female with PMHx of Dementia, HTN, HLD, recent CVA (right PICA) p/w syncope. Patient does not remember exactly what brought her to the ER.  Per patient's son, patient was following up with her cardiologist for follow up after having a CVA last week.  While sitting on the examination chair, patient became unresponsive for ~3o sec to 1 min.  Witnessed by son.  Patient did not fall or hit her head. Was back to baseline within a minute or two.  No seizure like activity, urinary/fecal incontinence, tongue biting.  Patient denies fevers/chills, CP, SOB, abd pain, NVD.  Patient's son states that the patient had been c/o headache and dizziness since being discharged after her CVA.  PER DC paperwork from [facility], patient had a TTE showing EF of 60% and mild aortic regurg, otherwise unremarkable.    Cardiology: Dr. karly Mcnulty

## 2021-10-27 NOTE — H&P ADULT - PROBLEM SELECTOR PLAN 2
-BP control  -c/w statin -BP control  -c/w ASA 81mg, plavix 75mg, Atorvastatin 40mg  -PT eval  -f/u lipids, A1C

## 2021-10-28 ENCOUNTER — FORM ENCOUNTER (OUTPATIENT)
Age: 86
End: 2021-10-28

## 2021-10-28 PROBLEM — E78.5 HYPERLIPIDEMIA, UNSPECIFIED: Chronic | Status: ACTIVE | Noted: 2017-05-14

## 2021-10-28 PROBLEM — I10 ESSENTIAL (PRIMARY) HYPERTENSION: Chronic | Status: ACTIVE | Noted: 2017-05-14

## 2021-10-28 LAB
A1C WITH ESTIMATED AVERAGE GLUCOSE RESULT: 6.3 % — HIGH (ref 4–5.6)
ANION GAP SERPL CALC-SCNC: 14 MMOL/L — SIGNIFICANT CHANGE UP (ref 5–17)
BUN SERPL-MCNC: 22 MG/DL — SIGNIFICANT CHANGE UP (ref 7–23)
CALCIUM SERPL-MCNC: 9.2 MG/DL — SIGNIFICANT CHANGE UP (ref 8.4–10.5)
CHLORIDE SERPL-SCNC: 105 MMOL/L — SIGNIFICANT CHANGE UP (ref 96–108)
CHOLEST SERPL-MCNC: 206 MG/DL — HIGH
CO2 SERPL-SCNC: 24 MMOL/L — SIGNIFICANT CHANGE UP (ref 22–31)
COVID-19 SPIKE DOMAIN AB INTERP: NEGATIVE — SIGNIFICANT CHANGE UP
COVID-19 SPIKE DOMAIN ANTIBODY RESULT: 0.4 U/ML — SIGNIFICANT CHANGE UP
CREAT SERPL-MCNC: 0.8 MG/DL — SIGNIFICANT CHANGE UP (ref 0.5–1.3)
ESTIMATED AVERAGE GLUCOSE: 134 MG/DL — HIGH (ref 68–114)
GLUCOSE SERPL-MCNC: 101 MG/DL — HIGH (ref 70–99)
HDLC SERPL-MCNC: 49 MG/DL — LOW
LIPID PNL WITH DIRECT LDL SERPL: 126 MG/DL — HIGH
NON HDL CHOLESTEROL: 156 MG/DL — HIGH
POTASSIUM SERPL-MCNC: 3.4 MMOL/L — LOW (ref 3.5–5.3)
POTASSIUM SERPL-SCNC: 3.4 MMOL/L — LOW (ref 3.5–5.3)
SARS-COV-2 IGG+IGM SERPL QL IA: 0.4 U/ML — SIGNIFICANT CHANGE UP
SARS-COV-2 IGG+IGM SERPL QL IA: NEGATIVE — SIGNIFICANT CHANGE UP
SODIUM SERPL-SCNC: 143 MMOL/L — SIGNIFICANT CHANGE UP (ref 135–145)
TRIGL SERPL-MCNC: 152 MG/DL — HIGH
TSH SERPL-MCNC: 3.8 UIU/ML — SIGNIFICANT CHANGE UP (ref 0.27–4.2)

## 2021-10-28 PROCEDURE — 93306 TTE W/DOPPLER COMPLETE: CPT | Mod: 26

## 2021-10-28 PROCEDURE — 99221 1ST HOSP IP/OBS SF/LOW 40: CPT

## 2021-10-28 RX ORDER — OMEGA-3 ACID ETHYL ESTERS 1 G
1 CAPSULE ORAL
Qty: 0 | Refills: 0 | DISCHARGE

## 2021-10-28 RX ORDER — QUINAPRIL HYDROCHLORIDE 40 MG/1
1 TABLET, FILM COATED ORAL
Qty: 0 | Refills: 0 | DISCHARGE

## 2021-10-28 RX ORDER — POTASSIUM CHLORIDE 20 MEQ
40 PACKET (EA) ORAL EVERY 4 HOURS
Refills: 0 | Status: COMPLETED | OUTPATIENT
Start: 2021-10-28 | End: 2021-10-28

## 2021-10-28 RX ORDER — LEVOTHYROXINE SODIUM 125 MCG
1 TABLET ORAL
Qty: 0 | Refills: 0 | DISCHARGE

## 2021-10-28 RX ORDER — MULTIVIT-MIN/FERROUS GLUCONATE 9 MG/15 ML
1 LIQUID (ML) ORAL
Qty: 0 | Refills: 0 | DISCHARGE

## 2021-10-28 RX ORDER — ATORVASTATIN CALCIUM 80 MG/1
1 TABLET, FILM COATED ORAL
Qty: 0 | Refills: 0 | DISCHARGE

## 2021-10-28 RX ORDER — ASPIRIN/CALCIUM CARB/MAGNESIUM 324 MG
1 TABLET ORAL
Qty: 0 | Refills: 0 | DISCHARGE

## 2021-10-28 RX ADMIN — Medication 40 MILLIEQUIVALENT(S): at 22:06

## 2021-10-28 RX ADMIN — LISINOPRIL 40 MILLIGRAM(S): 2.5 TABLET ORAL at 05:09

## 2021-10-28 RX ADMIN — ENOXAPARIN SODIUM 40 MILLIGRAM(S): 100 INJECTION SUBCUTANEOUS at 11:26

## 2021-10-28 RX ADMIN — CLOPIDOGREL BISULFATE 75 MILLIGRAM(S): 75 TABLET, FILM COATED ORAL at 11:25

## 2021-10-28 RX ADMIN — Medication 50 MICROGRAM(S): at 05:09

## 2021-10-28 RX ADMIN — ATORVASTATIN CALCIUM 40 MILLIGRAM(S): 80 TABLET, FILM COATED ORAL at 22:06

## 2021-10-28 RX ADMIN — Medication 81 MILLIGRAM(S): at 11:25

## 2021-10-28 RX ADMIN — Medication 40 MILLIEQUIVALENT(S): at 16:43

## 2021-10-28 NOTE — PHYSICAL THERAPY INITIAL EVALUATION ADULT - IMPAIRMENTS FOUND, PT EVAL
Patient: Joselito Abarca    Procedure(s):  Anterior Cervical Discectomy and Fusion Cervical Six - Cervical Seven, Exploration and Revision Cervical Four - Cervical Six with Hardware Removal - Wound Class: I-Clean   - Wound Class: I-Clean    Diagnosis: cervical stenosis  Diagnosis Additional Information: No value filed.    Anesthesia Type:   General, ETT     Note:  Airway :Face Mask  Patient transferred to:PACU  Handoff Report: Identifed the Patient, Identified the Reponsible Provider, Reviewed the pertinent medical history, Discussed the surgical course, Reviewed Intra-OP anesthesia mangement and issues during anesthesia, Set expectations for post-procedure period and Allowed opportunity for questions and acknowledgement of understanding      Vitals: (Last set prior to Anesthesia Care Transfer)    CRNA VITALS  11/29/2017 0912 - 11/29/2017 0951      11/29/2017             SpO2: 100 %                Electronically Signed By: Darrick Gama  November 29, 2017  9:51 AM   aerobic capacity/endurance/gait, locomotion, and balance/muscle strength

## 2021-10-28 NOTE — OCCUPATIONAL THERAPY INITIAL EVALUATION ADULT - ADDITIONAL COMMENTS
CT Head (10/27): Mild volume loss, microvascular disease, no acute hemorrhage or midline shift.  Subacute infarct involving nearly the entire right cerebellar hemisphere. No prior exam available for comparison

## 2021-10-28 NOTE — CONSULT NOTE ADULT - SUBJECTIVE AND OBJECTIVE BOX
CHIEF COMPLAINT:  Syncope    HISTORY OF PRESENT ILLNESS:  89F [facility] recent right-cerebellar CVA(10/2021), HTN, HLD, Hypothyroidism, Dementia presenting with LOC. History obtained from son (Pasha [phone]) as pt with baseline dementia. They were at her PCPs office for a post-hospital visit following her recent stroke when she was noted to have a blank stare while sitting up on the examination table with constricted pupils. She was not responsive and this episode lasted for about a minute followed by a slow return to baseline mentation. No reports of tongue-biting, generalized shaking or bladder/bowel incontinence. BP at the PMDs office was 118/58. He also reports since her return from the hospital for her recent stroke admission, she has complained of dizziness, and a HA in the back of her head. He brought a log of her BPs and she had readings as high as SBP-190s.No reports of chest pain, palpitations, SOB, abdominal pain, changes in BM, weakness or paresthesias, fall or head trauma.        Allergies    lidocaine (Unknown)    Intolerances    	  MEDICATIONS:  aspirin  chewable 81 milliGRAM(s) Oral daily  clopidogrel Tablet 75 milliGRAM(s) Oral daily  enoxaparin Injectable 40 milliGRAM(s) SubCutaneous daily  labetalol Injectable 10 milliGRAM(s) IV Push every 10 minutes PRN  lisinopril 40 milliGRAM(s) Oral daily  acetaminophen     Tablet .. 650 milliGRAM(s) Oral every 6 hours PRN  ondansetron Injectable 4 milliGRAM(s) IV Push every 8 hours PRN  aluminum hydroxide/magnesium hydroxide/simethicone Suspension 30 milliLiter(s) Oral every 4 hours PRN  atorvastatin 40 milliGRAM(s) Oral at bedtime  levothyroxine 50 MICROGram(s) Oral daily        PAST MEDICAL & SURGICAL HISTORY:  HTN (hypertension)  HLD (hyperlipidemia)  CVA (cerebrovascular accident) 2006  Cerebellar infarct right side/10/2021  Cerebellar infarct  HTN (hypertension)  Hypothyroid  High cholesterol  Cataracts, bilateral with surgery    No significant past surgical history    No significant past surgical history        FAMILY HISTORY:  No pertinent family history in first degree relatives    Patient unable to provide medical history        SOCIAL HISTORY:    [ ] Non-smoker  [ ] Smoker  [ ] Alcohol      REVIEW OF SYSTEMS:  Patient with hx of Dementia, denies any complaints at this time; no sob, dizziness, lightheadedness, chest pain, palpitations, n/v, abdominal pain, numbness, or tingling      PHYSICAL EXAM:  T(C): 36.6 (10-28-21 @ 11:45), Max: 37.2 (10-28-21 @ 08:19)  HR: 63 (10-28-21 @ 11:45) (60 - 80)  BP: 119/68 (10-28-21 @ 11:45) (119/68 - 164/61)  RR: 18 (10-28-21 @ 11:45) (16 - 18)  SpO2: 96% (10-28-21 @ 11:45) (94% - 99%)  Wt(kg): --  I&O's Summary      Appearance: in NAD  HEENT: Normocephalic, atraumatic  Cardiovascular: RRR S1 S2, No JVD, No m/r/g  Respiratory: Lungs clear to auscultation	  Psychiatry: alert, oriented to name and date of birth but not year; does not remember recent events of syncope or hospitalization  Gastrointestinal:  Soft, Non-tender, + BS	  Skin: No rashes, No ecchymoses, No cyanosis	  Extremities: Normal range of motion, No c/c/e  Vascular: Peripheral pulses palpable 2+ bilaterally        LABS:	 	    CBC Full  -  ( 27 Oct 2021 15:42 )  WBC Count : 5.90 K/uL  Hemoglobin : 13.4 g/dL  Hematocrit : 41.5 %  Platelet Count - Automated : 221 K/uL  Mean Cell Volume : 86.8 fl  Mean Cell Hemoglobin : 28.0 pg  Mean Cell Hemoglobin Concentration : 32.3 gm/dL  Auto Neutrophil # : 3.93 K/uL  Auto Lymphocyte # : 1.32 K/uL  Auto Monocyte # : 0.53 K/uL  Auto Eosinophil # : 0.09 K/uL  Auto Basophil # : 0.01 K/uL  Auto Neutrophil % : 66.6 %  Auto Lymphocyte % : 22.4 %  Auto Monocyte % : 9.0 %  Auto Eosinophil % : 1.5 %  Auto Basophil % : 0.2 %    10-28    143  |  105  |  22  ----------------------------<  101<H>  3.4<L>   |  24  |  0.80  10-27    141  |  102  |  22  ----------------------------<  122<H>  4.4   |  24  |  0.79    Ca    9.2      28 Oct 2021 06:59  Ca    9.7      27 Oct 2021 15:42    TPro  6.9  /  Alb  4.3  /  TBili  0.3  /  DBili  x   /  AST  36  /  ALT  27  /  AlkPhos  61  10-27      proBNP:   Lipid Profile:   HgA1c:   TSH: Thyroid Stimulating Hormone, Serum: 3.80 uIU/mL (10-28 @ 09:09)        CARDIAC MARKERS:      TELEMETRY: Sinus Bradycardia/NSR 50-70's, some PVC  	    ECG NSR @ 65 bpm, narrow QRSd 92ms  	  TTE 10/28/21:    Dimensions:    Normal Values:  LA:     3.4    2.0 - 4.0 cm  Ao:     2.7    2.0 - 3.8 cm  SEPTUM: 1.2    0.6 - 1.2 cm  PWT:    0.9    0.6 - 1.1 cm  LVIDd:  4.4    3.0 - 5.6 cm  LVIDs:  2.6    1.8 - 4.0 cm  Derived variables:  LVMI: 103 g/m2  RWT: 0.40  Fractional short: 41 %  EF (Woodruff Rule): 62 %Doppler Peak Velocity (m/sec):  AoV=1.1  ------------------------------------------------------------------------  Observations:  Mitral Valve: Normal mitral valve.  Aortic Valve/Aorta: Normal trileaflet aortic valve. Peak  transaortic valve gradient equals 5 mm Hg. Peak left  ventricular outflow tract gradient equals 4 mm Hg, mean  gradient is equal to 2 mm Hg, LVOT velocity time integral  equals 20 cm.  Aortic Root: 2.7 cm.  Left Atrium: Normal left atrium.  LA volume index = 24  cc/m2.  Left Ventricle: Normal left ventricular systolic function.  No segmental wall motion abnormalities. Basal septal  hypertrophy. Mild diastolic dysfunction (Stage I).  Right Heart: Normal right atrium. Normal right ventricular  size and function. Normal tricuspid valve. Minimal  tricuspid regurgitation. Normal pulmonic valve. Minimal  pulmonic regurgitation.  Pericardium/Pleura: Normal pericardium with no pericardial  effusion.  Hemodynamic: Estimated right atrial pressure is 8 mm Hg.  Estimated right ventricular systolic pressure equals 27 mm  Hg, assuming right atrial pressure equals 8 mm Hg,  consistent with normal pulmonary pressures.  ------------------------------------------------------------------------  Conclusions:  1. Basal septal hypertrophy.  2. Normal left ventricular systolic function. No segmental  wall motion abnormalities.  3. Mild diastolic dysfunction (Stage I).  4. Normal right ventricular size and function.  *** No previous Echo exam.      EXAM:  CT BRAIN    10/27/2021:                 HEAD CT:    VENTRICLES AND SULCI: Ventricles and sulci are unremarkable for patient age.  INTRA-AXIAL: No intracranial mass, acute hemorrhage, or midline shift is present. There is non-specific decreased attenuation in the white matter likely related to sequelae of microvascular disease. Large area of subacute appearing infarct in nearly the entireright cerebellar hemisphere.  EXTRA-AXIAL: No extra-axial fluid collection is present.  INTRACRANIAL HEMORRHAGE: None.    VISUALIZED SINUSES: No air-fluid levels are identified.  VISUALIZED MASTOIDS: Scattered opacifications in the right mastoid air cells.  CALVARIUM:  Intact.  MISCELLANEOUS:  None.    SOFT TISSUES: Unremarkable.  BONES: Unremarkable.      IMPRESSION:    HEAD CT: Mild volume loss, microvascular disease, no acute hemorrhage or midline shift.  Subacute infarct involving nearly the entire right cerebellar hemisphere. No prior exam available for comparison.

## 2021-10-28 NOTE — PHYSICAL THERAPY INITIAL EVALUATION ADULT - PERTINENT HX OF CURRENT PROBLEM, REHAB EVAL
Patient is a 89 yo F with PMHx of Dementia, HTN, HLD, recently presented to [facility] for nausea, vomiting, dizziness, and being found down found with R cerebellar stroke in R PICA distribution. Discharged on aspirin/plavix who presents with brief episode <1 min of becoming nonresponding to family while sitting that appeared to be concerning for syncope.

## 2021-10-28 NOTE — PHYSICAL THERAPY INITIAL EVALUATION ADULT - PRECAUTIONS/LIMITATIONS, REHAB EVAL
As per neuro, Pt with recent large subacute infarct of R cerebellar hemisphere presents with acute brief change in responsiveness without any tonic/clonic activity noted or signs of seizure. Can also consider acute brief vertigo or syncope as other potential etiologies. No signs of infectious toxic or metabolic causes. IMAGING: CT HEAD: Mild volume loss, microvascular disease, no acute hemorrhage or midline shift./cardiac precautions/fall precautions

## 2021-10-28 NOTE — PHYSICAL THERAPY INITIAL EVALUATION ADULT - ADDITIONAL COMMENTS
Pt lives with son in private home with 0 steps to enter and flight of stairs inside home. PTA, pt independent in all functional mobility without use of AD.

## 2021-10-28 NOTE — OCCUPATIONAL THERAPY INITIAL EVALUATION ADULT - PERTINENT HX OF CURRENT PROBLEM, REHAB EVAL
89 yo F with PMHx of Dementia, HTN, HLD, recently presented to [facility] for nausea, vomiting, dizziness, and being found down found with R cerebellar stroke in R PICA distribution. Discharged on aspirin/plavix who presents with brief episode <1 min of becoming nonresponding to family while sitting that appeared to be concerning for syncope

## 2021-10-28 NOTE — CONSULT NOTE ADULT - ATTENDING COMMENTS
Agree with above NP note. Currently refusing ILR implantation for workup of syncopal episode. Will plan instead for event monitoring upon discharge.
Briefly  89 yo   F with Dementia, HTN, HLD, recently presented to [facility] for nausea, vomiting, dizziness, and being found down found with R cerebellar stroke in R PICA distribution. Discharged on aspirin/plavix who presents with brief episode <1 min of becoming non responsive to family while sitting that appeared to be concerning for ?syncope. Vs 97.8, HR60, 124/58, RR16, 98%RA.     NIHSS:  1   Baseline MRS:  1    CT head at [facility] 10/27: Subacute infarct involving nearly the entire R cerebellar hemisphere.      Recent workup at [facility]:  - TTE 10/25: mild LV hypertrophy, R atrium normal size, LVEf 60%, trace MR, trace AR, mild TR. Otherwise unremarkable for cause of stroke  - Carotid duplex 10/25: 0-19% stenosis of DENY, LICA. Antegrade flow of R/L verts.   - MR brain: acute R cerebellar infarct in distribution of R PICA territory. No other acute infarct. No intracranial hemorrhage. Does have old lacunar infarcts and chronic small vessel disease. MRA anterior circulation was not well visualized 2/2 motion.   A1c 6.3,   Impression: Patient with recent large subacute infarct of R cerebellar hemisphere presents with acute brief change in responsiveness without any tonic/clonic activity noted or signs of seizure. Can also consider acute brief vertigo or syncope as other potential etiologies as well as seizure No signs of infectious toxic or metabolic causes.      Mechanism: Stroke 2/2 ESUS     Recommendations:   - orthostatics  - vessel imaging if able with CTA h/n. already had CD   - Aspirin 81mg PO, plavix 75mg PO continue for 3 weeks followed by aspirin 81mg alone   - Atorvastatin 40mg daily (long-term goal and titrate to LDL < 70)  - cardio can consider ILR/extended cardiac monitor for detection of AF   - Can consider obtaining spot EEG if high clinical suspicion. Patient at risk for seizure given recent stroke but clinical history appears less suspicious for seizure event.  - Tight glucose control (long-term goal HgbA1c < 6%)  - Stroke education and counseling  - Neuro-checks with VS q4h  - Dysphagia screen if required  - PT/ OT / CM/ SW evaluations  - DVT ppx: Lovenox 40 mg Sq vs heparin subq unless contraindicated in which case SCDs   Francisco Ahmadi MD  Vascular Neurology  Office: [phone]

## 2021-10-28 NOTE — CONSULT NOTE ADULT - SUBJECTIVE AND OBJECTIVE BOX
CHIEF COMPLAINT: syncope    HISTORY OF PRESENT ILLNESS:  89 yo F with PMHx of Dementia, HTN, HLD, recent CVA (R PICA) on aspirin/plavix presents with syncope. Patient accompanied by son who states patient recently went to [facility] on saturday as she was found passed out, with nausea, vomiting, dizziness. At [facility] found to have acute infarct R inferior cerebellum (R PICA distribution). Was discharged and went to see PCP, but was found to be staring, being unresponsive briefly for <1min. No reported tonic/ clonic activity, urinary incontinence or tongue biting. Has since returned to baseline. Son brought [facility] paperwork with comprehensive stroke workup already performed. Does not follow a stroke neurologist. Currently on aspirin/ plavix. NIHSS at [facility] was 2, pre mrs 1. exam was AOx1, otherwise unremarkable except for gait instability. Denies current fever, chills, headaches, vision changes, nausea, vomiting, hearing change, focal weakness, focal numbness. Appears family did not want to take her to rehab. Stays with other son.       Allergies    lidocaine (Unknown)    Intolerances    	    MEDICATIONS:  aspirin  chewable 81 milliGRAM(s) Oral daily  clopidogrel Tablet 75 milliGRAM(s) Oral daily  enoxaparin Injectable 40 milliGRAM(s) SubCutaneous daily  labetalol Injectable 10 milliGRAM(s) IV Push every 10 minutes PRN  lisinopril 40 milliGRAM(s) Oral daily        acetaminophen     Tablet .. 650 milliGRAM(s) Oral every 6 hours PRN  ondansetron Injectable 4 milliGRAM(s) IV Push every 8 hours PRN    aluminum hydroxide/magnesium hydroxide/simethicone Suspension 30 milliLiter(s) Oral every 4 hours PRN    atorvastatin 40 milliGRAM(s) Oral at bedtime  levothyroxine 50 MICROGram(s) Oral daily        PAST MEDICAL & SURGICAL HISTORY:  CVA (cerebrovascular accident)  2006    Cerebellar infarct  right side/10/2021    Cerebellar infarct    HTN (hypertension)    Hypothyroid    High cholesterol    Cataracts, bilateral  with surgery    No significant past surgical history        FAMILY HISTORY:  Patient unable to provide medical history        SOCIAL HISTORY:    non smoker. indep in adl      REVIEW OF SYSTEMS:  See HPI, otherwise complete 10 point review of systems negative    [ ] All others negative	      PHYSICAL EXAM:  T(C): 36.6 (10-28-21 @ 04:43), Max: 37.1 (10-28-21 @ 01:20)  HR: 80 (10-28-21 @ 04:43) (60 - 80)  BP: 149/81 (10-28-21 @ 04:43) (124/58 - 164/61)  RR: 18 (10-28-21 @ 04:43) (16 - 18)  SpO2: 96% (10-28-21 @ 04:43) (95% - 99%)  Wt(kg): --  I&O's Summary      Appearance: No Acute Distress	  HEENT:  Normal oral mucosa, PERRL, EOMI	  Cardiovascular: Normal S1 S2, No JVD, No murmurs/rubs/gallops  Respiratory: Lungs clear to auscultation bilaterally  Gastrointestinal:  Soft, Non-tender, + BS	  Skin: No rashes, No ecchymoses, No cyanosis	  Neurologic: Non-focal  Extremities: No clubbing, cyanosis or edema  Vascular: Peripheral pulses palpable 2+ bilaterally  Psychiatry: A & O x 3, Mood & affect appropriate    Laboratory Data:	 	    CBC Full  -  ( 27 Oct 2021 15:42 )  WBC Count : 5.90 K/uL  Hemoglobin : 13.4 g/dL  Hematocrit : 41.5 %  Platelet Count - Automated : 221 K/uL  Mean Cell Volume : 86.8 fl  Mean Cell Hemoglobin : 28.0 pg  Mean Cell Hemoglobin Concentration : 32.3 gm/dL  Auto Neutrophil # : 3.93 K/uL  Auto Lymphocyte # : 1.32 K/uL  Auto Monocyte # : 0.53 K/uL  Auto Eosinophil # : 0.09 K/uL  Auto Basophil # : 0.01 K/uL  Auto Neutrophil % : 66.6 %  Auto Lymphocyte % : 22.4 %  Auto Monocyte % : 9.0 %  Auto Eosinophil % : 1.5 %  Auto Basophil % : 0.2 %    10-28    143  |  105  |  22  ----------------------------<  101<H>  3.4<L>   |  24  |  0.80  10-27    141  |  102  |  22  ----------------------------<  122<H>  4.4   |  24  |  0.79    Ca    9.2      28 Oct 2021 06:59  Ca    9.7      27 Oct 2021 15:42    TPro  6.9  /  Alb  4.3  /  TBili  0.3  /  DBili  x   /  AST  36  /  ALT  27  /  AlkPhos  61  10-27      proBNP:   Lipid Profile:   HgA1c:   TSH:       CARDIAC MARKERS:            Interpretation of Telemetry: 	nsr    ECG:  	nsr prolonged pr irbbb lvh  RADIOLOGY:  OTHER: 	    PREVIOUS DIAGNOSTIC TESTING:    [ ] Echocardiogram:  [ ] Catheterization:  [ ] Stress Test:  	    Assessment:  recent CVA (R PICA) on aspirin/plavix  HTN  HLD   unresponsive episode    Recs:  cv stable  no e/o ischemia or chf at present  cw tele monitoring. check 2d echo. will have ep evaluate for possible ILR given cryptogenic stroke and recent unresponsive episode of unclear etiology (? arrhythmogenic vs neurologically mediated)  f/u neuro recs  dvt ppx  Advanced care planning forms were discussed. Code status including forceful chest compressions, defibrillation and intubation were discussed. The risks benefits and alternatives to pertinent cardiac procedures and interventions were discussed in detail and all questions were answered. Duration: 15 minutes.        Greater than 90 minutes spent on total encounter; more than 50% of the visit was spent counseling and/or coordinating care by the attending physician.   	  Parish Mcnulty MD   Cardiovascular Diseases  [phone]

## 2021-10-28 NOTE — CONSULT NOTE ADULT - ASSESSMENT
89 y/o female hx of HTN, HLD, CVA >51 yrs ago, Hypothyroidism, recent CVA (R PICA) 10/2021 a/w syncope while @ cardiologist office. TTE 10/28 with EF 62%, no SWMA. EPS consulted for ILR evaluation    1. Recent R PICA 10/2021  2. Syncope  - Review of telemetry since admission in [facility]/NSR, no atrial ectopic beats  - TTE result noted  - Currently maintained on DAPT  - D/w patient and son Baldev options regarding cardiac monitoring including ILR, both does not want any invasive cardiac monitoring  - Noninvasive cardiac monitoring also d/w patient and son, they prefer patch    Tracee Jenkins, ANP-C 89 y/o female hx of HTN, HLD, CVA >51 yrs ago, Hypothyroidism, recent CVA (R PICA) 10/2021 a/w syncope while @ cardiologist office. TTE 10/28 with EF 62%, no SWMA. EPS consulted for ILR evaluation    1. Recent R PICA 10/2021  2. Syncope  - Review of telemetry since admission in [facility]/NSR, no atrial ectopic beats  - TTE result noted  - Currently maintained on DAPT  - D/w patient and son Baldev options regarding cardiac monitoring including ILR, both does not want any invasive cardiac monitoring  - Noninvasive cardiac monitoring also d/w patient and son, they prefer patch, please call [phone] on day of discharge for 30 days of Biotel patch monitor  - Will sign off, please reconsult as needed      Tracee Jenkins, ANP-C

## 2021-10-29 ENCOUNTER — TRANSCRIPTION ENCOUNTER (OUTPATIENT)
Age: 86
End: 2021-10-29

## 2021-10-29 VITALS
OXYGEN SATURATION: 97 % | RESPIRATION RATE: 18 BRPM | DIASTOLIC BLOOD PRESSURE: 75 MMHG | SYSTOLIC BLOOD PRESSURE: 145 MMHG | TEMPERATURE: 98 F | HEART RATE: 75 BPM

## 2021-10-29 LAB
ANION GAP SERPL CALC-SCNC: 12 MMOL/L — SIGNIFICANT CHANGE UP (ref 5–17)
BUN SERPL-MCNC: 29 MG/DL — HIGH (ref 7–23)
CALCIUM SERPL-MCNC: 9.2 MG/DL — SIGNIFICANT CHANGE UP (ref 8.4–10.5)
CHLORIDE SERPL-SCNC: 107 MMOL/L — SIGNIFICANT CHANGE UP (ref 96–108)
CO2 SERPL-SCNC: 21 MMOL/L — LOW (ref 22–31)
CREAT SERPL-MCNC: 0.82 MG/DL — SIGNIFICANT CHANGE UP (ref 0.5–1.3)
GLUCOSE SERPL-MCNC: 108 MG/DL — HIGH (ref 70–99)
POTASSIUM SERPL-MCNC: 4.9 MMOL/L — SIGNIFICANT CHANGE UP (ref 3.5–5.3)
POTASSIUM SERPL-SCNC: 4.9 MMOL/L — SIGNIFICANT CHANGE UP (ref 3.5–5.3)
SODIUM SERPL-SCNC: 140 MMOL/L — SIGNIFICANT CHANGE UP (ref 135–145)

## 2021-10-29 PROCEDURE — 85025 COMPLETE CBC W/AUTO DIFF WBC: CPT

## 2021-10-29 PROCEDURE — 84443 ASSAY THYROID STIM HORMONE: CPT

## 2021-10-29 PROCEDURE — 80048 BASIC METABOLIC PNL TOTAL CA: CPT

## 2021-10-29 PROCEDURE — 95816 EEG AWAKE AND DROWSY: CPT | Mod: 26

## 2021-10-29 PROCEDURE — 95816 EEG AWAKE AND DROWSY: CPT

## 2021-10-29 PROCEDURE — 83036 HEMOGLOBIN GLYCOSYLATED A1C: CPT

## 2021-10-29 PROCEDURE — U0003: CPT

## 2021-10-29 PROCEDURE — 97165 OT EVAL LOW COMPLEX 30 MIN: CPT

## 2021-10-29 PROCEDURE — 86769 SARS-COV-2 COVID-19 ANTIBODY: CPT

## 2021-10-29 PROCEDURE — 93306 TTE W/DOPPLER COMPLETE: CPT

## 2021-10-29 PROCEDURE — 81001 URINALYSIS AUTO W/SCOPE: CPT

## 2021-10-29 PROCEDURE — 80053 COMPREHEN METABOLIC PANEL: CPT

## 2021-10-29 PROCEDURE — 70450 CT HEAD/BRAIN W/O DYE: CPT | Mod: MG

## 2021-10-29 PROCEDURE — 97161 PT EVAL LOW COMPLEX 20 MIN: CPT

## 2021-10-29 PROCEDURE — G1004: CPT

## 2021-10-29 PROCEDURE — 99285 EMERGENCY DEPT VISIT HI MDM: CPT

## 2021-10-29 PROCEDURE — U0005: CPT

## 2021-10-29 PROCEDURE — 80061 LIPID PANEL: CPT

## 2021-10-29 RX ORDER — CLOPIDOGREL BISULFATE 75 MG/1
1 TABLET, FILM COATED ORAL
Qty: 0 | Refills: 0 | DISCHARGE
Start: 2021-10-29 | End: 2021-11-19

## 2021-10-29 RX ORDER — CLOPIDOGREL BISULFATE 75 MG/1
1 TABLET, FILM COATED ORAL
Qty: 0 | Refills: 0 | DISCHARGE

## 2021-10-29 RX ORDER — CHLORHEXIDINE GLUCONATE 213 G/1000ML
1 SOLUTION TOPICAL DAILY
Refills: 0 | Status: DISCONTINUED | OUTPATIENT
Start: 2021-10-29 | End: 2021-10-29

## 2021-10-29 RX ORDER — VERAPAMIL HCL 240 MG
1 CAPSULE, EXTENDED RELEASE PELLETS 24 HR ORAL
Qty: 0 | Refills: 0 | DISCHARGE

## 2021-10-29 RX ADMIN — ENOXAPARIN SODIUM 40 MILLIGRAM(S): 100 INJECTION SUBCUTANEOUS at 11:55

## 2021-10-29 RX ADMIN — Medication 50 MICROGRAM(S): at 05:19

## 2021-10-29 RX ADMIN — CLOPIDOGREL BISULFATE 75 MILLIGRAM(S): 75 TABLET, FILM COATED ORAL at 11:55

## 2021-10-29 RX ADMIN — Medication 81 MILLIGRAM(S): at 11:54

## 2021-10-29 RX ADMIN — LISINOPRIL 40 MILLIGRAM(S): 2.5 TABLET ORAL at 05:19

## 2021-10-29 NOTE — EEG REPORT - NS EEG TEXT BOX
[facility]   COMPREHENSIVE EPILEPSY CENTER   REPORT OF ROUTINE VIDEO EEG     [facility]: [address], [place] [address], Ph#: [phone]  [facility]: [address], [place] [address], Ph#: [phone]  [facility]: [address] [place] [address], Ph#: [phone]    Patient Name: RUSS SINGH  Age and : 89y (30)  MRN #: [id number]  Location: [address]   Referring Physician: Andrew Grubbs    Study Date: 10-29-21    _____________________________________________________________  TECHNICAL INFORMATION    Placement and Labeling of Electrodes:  The EEG was performed utilizing 20 channels referential EEG connections (coronal over temporal over parasagittal montage) using all standard 10-20 electrode placements with EKG.  Recording was at a sampling rate of 256 samples per second per channel.  Time synchronized digital video recording was done simultaneously with EEG recording.  A low light infrared camera was used for low light recording.  Jonah and seizure detection algorithms were utilized.    _____________________________________________________________  HISTORY    Patient is a 89y old  Female who presents with a chief complaint of Syncope (29 Oct 2021 08:24)    PERTINENT MEDICATION:  none  _____________________________________________________________  STUDY INTERPRETATION    Findings: The background was continuous, spontaneously variable and reactive. During wakefulness, the posterior dominant rhythm consisted of symmetric, well-modulated 9 Hz activity, with amplitude to 30 uV, that attenuated to eye opening.  Low amplitude frontal beta was noted in wakefulness.    Background Slowing:  No generalized background slowing was present.    Focal Slowing:   None were present.    Sleep Background:  Drowsiness was characterized by fragmentation, attenuation, and slowing of the background activity.    Stage II sleep transients were not recorded.    Other Non-Epileptiform Findings:  None were present.    Interictal Epileptiform Activity:   None were present.    Events:  Clinical events: None recorded.  Seizures: None recorded.    Activation Procedures:   Hyperventilation was not performed.    Photic stimulation was performed and did not elicit any abnormality.     Artifacts:  Intermittent myogenic and movement artifacts were noted.    ECG:  The heart rate on single channel ECG was predominantly between 55-65 BPM.  _____________________________________________________________  EEG SUMMARY/CLASSIFICATION    Normal EEG in the awake, drowsy states.  _____________________________________________________________  EEG IMPRESSION/CLINICAL CORRELATE    Normal EEG study.  No epileptiform pattern or seizure seen.    Vickey Wang MD PGY-5  Epilepsy Fellow    This Preliminary report is based on fellow review. Final report pending attending review.    Reading Room: [phone]  On Call Service After Hours: [phone] [facility]   COMPREHENSIVE EPILEPSY CENTER   REPORT OF ROUTINE VIDEO EEG     [facility]: [address], [place] [address], Ph#: [phone]  [facility]: [address], [place] [address], Ph#: [phone]  [facility]: [address] [place] [address], Ph#: [phone]    Patient Name: RUSS SINGH  Age and : 89y (30)  MRN #: [id number]  Location: [address]   Referring Physician: Andrew Grubbs    Study Date: 10-29-21    _____________________________________________________________  TECHNICAL INFORMATION    Placement and Labeling of Electrodes:  The EEG was performed utilizing 20 channels referential EEG connections (coronal over temporal over parasagittal montage) using all standard 10-20 electrode placements with EKG.  Recording was at a sampling rate of 256 samples per second per channel.  Time synchronized digital video recording was done simultaneously with EEG recording.  A low light infrared camera was used for low light recording.  Jonah and seizure detection algorithms were utilized.    _____________________________________________________________  HISTORY    Patient is a 89y old  Female who presents with a chief complaint of Syncope (29 Oct 2021 08:24)    PERTINENT MEDICATION:  none  _____________________________________________________________  STUDY INTERPRETATION    Findings: The background was continuous, spontaneously variable and reactive. During wakefulness, the posterior dominant rhythm consisted of symmetric, well-modulated 9 Hz activity, with amplitude to 30 uV, that attenuated to eye opening.  Low amplitude frontal beta was noted in wakefulness.    Background Slowing:  No generalized background slowing was present.    Focal Slowing:   None were present.    Sleep Background:  Drowsiness was characterized by fragmentation, attenuation, and slowing of the background activity.    Stage II sleep transients were not recorded.    Other Non-Epileptiform Findings:  None were present.    Interictal Epileptiform Activity:   None were present.    Events:  Clinical events: None recorded.  Seizures: None recorded.    Activation Procedures:   Hyperventilation was not performed.    Photic stimulation was performed and did not elicit any abnormality.     Artifacts:  Intermittent myogenic and movement artifacts were noted.    ECG:  The heart rate on single channel ECG was predominantly between 55-65 BPM.  _____________________________________________________________  EEG SUMMARY/CLASSIFICATION    Normal EEG in the awake, drowsy states.  _____________________________________________________________  EEG IMPRESSION/CLINICAL CORRELATE    Normal EEG study.  No epileptiform pattern or seizure seen.    Vickey Wang MD PGY-5  Epilepsy Fellow    Hal Fowler MD  EEG/Epilepsy Attending    Reading Room: [phone]  On Call Service After Hours: [phone]

## 2021-10-29 NOTE — PROGRESS NOTE ADULT - ASSESSMENT
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 Admit for syncope w/u vs seizure activity      Syncope.   - suspect possible seizure activity in s/o recent stroke vs cardiogenic in s/o baseline 1st Degree AV block  - CTH w/o e/o acute intracranial process; notable for subacute infarct of right cerebellar hemisphere  - f/u orthostats  - tele monitoring  - PER DC paperwork from [facility], patient had a TTE showing EF of 60% and mild aortic regurg, otherwise unremarkable.  - TTE w/ bubble study per neurology recs  - off verapamil for now giving AV levon blocking activity  - EEG for seizure-like activity  - seizure precautions.    Cerebrovascular accident (CVA) involving cerebellum.   - BP control  - c/w ASA 81mg, plavix 75mg, Atorvastatin 40mg  - PT eval  - f/u lipids, A1C.    HTN (hypertension).   - Monitor vitals  - c/w Losartan 40m qd  - labetalol prn for SBP>180.    HLD (hyperlipidemia).   - f/u lipid panel  - c/w atorvastatin 40mg for now and considering uptitrating if elevated lipids.    Hypothyroidism.  - c/w synthroid  - f/u thyroid labs.    Prophylactic measure.   - Diet: DASH/TLC  - DVT ppx: lovenox  - Code:DNR/DNI.  Andrew Grubbs MD pager [phone]   
89 yo   F with Dementia, HTN, HLD, recently presented to [facility] for nausea, vomiting, dizziness, and being found down found with R cerebellar stroke in R PICA distribution. Discharged on aspirin/plavix who presents with brief episode <1 min of becoming non responsive to family while sitting that appeared to be concerning for ?syncope. Vs 97.8, HR60, 124/58, RR16, 98%RA.     NIHSS:  1   Baseline MRS:  1    CT head at [facility] 10/27: Subacute infarct involving nearly the entire R cerebellar hemisphere.      Recent workup at [facility]:  - TTE 10/25: mild LV hypertrophy, R atrium normal size, LVEf 60%, trace MR, trace AR, mild TR. Otherwise unremarkable for cause of stroke  - Carotid duplex 10/25: 0-19% stenosis of DENY, LICA. Antegrade flow of R/L verts.   - MR brain: acute R cerebellar infarct in distribution of R PICA territory. No other acute infarct. No intracranial hemorrhage. Does have old lacunar infarcts and chronic small vessel disease. MRA anterior circulation was not well visualized 2/2 motion.   A1c 6.3,   Impression: Patient with recent large subacute infarct of R cerebellar hemisphere presents with acute brief change in responsiveness without any tonic/clonic activity noted or signs of seizure. Can also consider acute brief vertigo or syncope as other potential etiologies as well as seizure No signs of infectious toxic or metabolic causes.      Mechanism: Stroke 2/2 ESUS     Recommendations:   - orthostatics lying 151/73 HR 73, standing 136/86 HR 85  - vessel imaging if able with CTA h/n. already had CD   - Aspirin 81mg PO, plavix 75mg PO continue for 3 weeks followed by aspirin 81mg alone   - Atorvastatin 40mg daily (long-term goal and titrate to LDL < 70)  - cardio can consider ILR/extended cardiac monitor for detection of AF . biotel patch on discharge   - Can consider obtaining spot EEG if high clinical suspicion. Patient at risk for seizure given recent stroke but clinical history appears less suspicious for seizure event. can also do outpt.   - telemetry  - PT/OT, OOBC  - check FS, glucose control <180  - GI/DVT ppx  - Counseling on diet, exercise, and medication adherence was done  - Counseling on smoking cessation and alcohol consumption offered when appropriate.  - Pain assessed and judicious use of narcotics when appropriate was discussed.    - Stroke education given when appropriate.  - Importance of fall prevention discussed.   - Differential diagnosis and plan of care discussed with patient and/or family and primary team  - Thank you for allowing me to participate in the care of this patient. Call with questions.     Francisco Ahmadi MD  Vascular Neurology  Office: [phone] .    
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 Admit for syncope w/u vs seizure activity      Syncope.   - CTH w/o e/o acute intracranial process; notable for subacute infarct of right cerebellar hemisphere  - f/u orthostats  - tele monitoring  - PER DC paperwork from [facility], patient had a TTE showing EF of 60% and mild aortic regurg, otherwise unremarkable.  - off verapamil for now giving AV levon blocking activity  - EEG noted  - seizure precautions.    Cerebrovascular accident (CVA) involving cerebellum.   - BP control  - c/w ASA 81mg, plavix 75mg, Atorvastatin 40mg  - PT eval  - f/u lipids, A1C.    HTN (hypertension).   - Monitor vitals  - c/w Losartan 40m qd  - labetalol prn for SBP>180.    HLD (hyperlipidemia).   - f/u lipid panel  - c/w atorvastatin 40mg for now and considering uptitrating if elevated lipids.    Hypothyroidism.  - c/w synthroid  - f/u thyroid labs.    Prophylactic measure.   - Diet: DASH/TLC  - DVT ppx: lovenox  - Code: DNR/DNI.    DC home . Follow with PMD/ Cardiology in 3-4 days.    Andrwe Grubbs MD pager [phone]

## 2021-10-29 NOTE — DISCHARGE NOTE NURSING/CASE MANAGEMENT/SOCIAL WORK - PATIENT PORTAL LINK FT
You can access the FollowMyHealth Patient Portal offered by [facility] by registering at the following website: [email or web address]. By joining [facility]’s FollowMyHealth portal, you will also be able to view your health information using other applications (apps) compatible with our system.

## 2021-10-29 NOTE — DISCHARGE NOTE PROVIDER - NSDCCPCAREPLAN_GEN_ALL_CORE_FT
PRINCIPAL DISCHARGE DIAGNOSIS  Diagnosis: Syncope  Assessment and Plan of Treatment:   - continue on aspirin 81mg and Plavix 75mg daily for 3 weeks, then continue on aspirin only   - continue on atorvastatin 40mg daily  - Biotel patch placed on day of discharge, please follow up with your cardiologist Dr Mcnulty      SECONDARY DISCHARGE DIAGNOSES  Diagnosis: Hypertension  Assessment and Plan of Treatment: Continue with your blood pressure medications; eat a heart healthy diet with low salt diet; exercise regularly (consult with your physician or cardiologist first); maintain a heart healthy weight; if you are on medication to help you quit smoking, be sure to take it as prescribed. Find healthy ways to deal with stress, such as exercise (check with your healthcare provider first), deep breathing, meditation, or enjoyable healthy hobbies. Continue to follow with your primary care physician or cardiologist.      Diagnosis: Hyperlipidemia  Assessment and Plan of Treatment: Continue with your cholesterol medications. Eat a heart healthy diet that is low in saturated fats and salt, and includes whole grains, fruits, vegetables and lean protein; exercise regularly (consult with your physician or cardiologist first); maintain a heart healthy weight; if are on medication to help you quit smoking, be sure to take it as prescribed. Find healthy ways to deal with stress, such as exercise (check with your healthcare provider first), deep breathing, meditation, or enjoyable healthy hobbies. Continue to follow with your primary physician or cardiologist.

## 2021-10-29 NOTE — DISCHARGE NOTE PROVIDER - NSDCMRMEDTOKEN_GEN_ALL_CORE_FT
aluminum hydroxide-magnesium hydroxide 200 mg-200 mg/5 mL oral suspension: 30 milliliter(s) orally every 4 hours, As needed, Dyspepsia  aspirin 81 mg oral tablet, chewable: 1 tab(s) orally once a day  atorvastatin 40 mg oral tablet: 1 tab(s) orally once a day  clopidogrel 75 mg oral tablet: 1 tab(s) orally once a day for 3 weeks only  Fish Oil: 1 cap(s) orally once a day  PreserVision AREDS 2 oral capsule: 1 cap(s) orally once a day  quinapril 40 mg oral tablet: 1 tab(s) orally 2 times a day  Synthroid 50 mcg (0.05 mg) oral tablet: 1 tab(s) orally once a day

## 2021-10-29 NOTE — PROGRESS NOTE ADULT - SUBJECTIVE AND OBJECTIVE BOX
Neurology Progress Note    S: Patient seen and examined. No new events overnight. patient denied CP, SOB, HA or pain.     Medication:  acetaminophen     Tablet .. 650 milliGRAM(s) Oral every 6 hours PRN  aluminum hydroxide/magnesium hydroxide/simethicone Suspension 30 milliLiter(s) Oral every 4 hours PRN  aspirin  chewable 81 milliGRAM(s) Oral daily  atorvastatin 40 milliGRAM(s) Oral at bedtime  chlorhexidine 2% Cloths 1 Application(s) Topical daily  clopidogrel Tablet 75 milliGRAM(s) Oral daily  enoxaparin Injectable 40 milliGRAM(s) SubCutaneous daily  labetalol Injectable 10 milliGRAM(s) IV Push every 10 minutes PRN  levothyroxine 50 MICROGram(s) Oral daily  lisinopril 40 milliGRAM(s) Oral daily  ondansetron Injectable 4 milliGRAM(s) IV Push every 8 hours PRN      Vitals:  Vital Signs Last 24 Hrs  T(C): 36.5 (29 Oct 2021 04:37), Max: 37 (28 Oct 2021 20:39)  T(F): 97.7 (29 Oct 2021 04:37), Max: 98.6 (28 Oct 2021 20:39)  HR: 69 (29 Oct 2021 04:37) (63 - 72)  BP: 151/79 (29 Oct 2021 04:37) (119/68 - 152/76)  BP(mean): --  RR: 18 (29 Oct 2021 04:37) (17 - 18)  SpO2: 95% (29 Oct 2021 04:37) (93% - 96%)    General Exam:   General Appearance: Appropriately dressed and in no acute distress       Head: Normocephalic, atraumatic and no dysmorphic features  Ear, Nose, and Throat: Moist mucous membranes  CVS: S1S2+  Resp: No SOB, no wheeze or rhonchi  Abd: soft NTND  Extremities: No edema, no cyanosis  Skin: No bruises, no rashes     Neurological Exam:  MS: Awake, alert, oriented to person, place, situation, not time. Normal affect. Follows all commands. Cooperative.   Language: Speech is clear, fluent, intact with normal tone/rate/ volume and good repetition,  comprehension, registration of words.   CNs: PERRL (R = 3mm, L = 3mm). VFF. EOMI no nystagmus. V1-3 intact to LT, well developed masseter muscles b/l. No marked facial asymmetry b/l, full eye closure strength b/l. Hearing grossly normal (rubbing fingers) b/l.  Gag reflex deferred.     Motor: Normal muscle bulk & tone. No noticeable tremor or seizure. No pronator drift.              Deltoid	Biceps	Triceps	   R	5	5	5	5		 	  L	5	5	5	5			  	H-Flex	K-Ext	D-Flex	P-Flex  R	5	5	5	5			 	   L	5	5	5	5		     Sensation: Intact to LT  b/l., Cortical: Extinction on DSS (neglect): none     Coordination: very subtle dysmetria of upper extremity     Gait: Narrow based and steady. Able to tandem. no limitations in gait.     I personally reviewed the below data/images/labs:      CBC Full  -  ( 27 Oct 2021 15:42 )  WBC Count : 5.90 K/uL  RBC Count : 4.78 M/uL  Hemoglobin : 13.4 g/dL  Hematocrit : 41.5 %  Platelet Count - Automated : 221 K/uL  Mean Cell Volume : 86.8 fl  Mean Cell Hemoglobin : 28.0 pg  Mean Cell Hemoglobin Concentration : 32.3 gm/dL  Auto Neutrophil # : 3.93 K/uL  Auto Lymphocyte # : 1.32 K/uL  Auto Monocyte # : 0.53 K/uL  Auto Eosinophil # : 0.09 K/uL  Auto Basophil # : 0.01 K/uL  Auto Neutrophil % : 66.6 %  Auto Lymphocyte % : 22.4 %  Auto Monocyte % : 9.0 %  Auto Eosinophil % : 1.5 %  Auto Basophil % : 0.2 %    10-29    140  |  107  |  29<H>  ----------------------------<  108<H>  4.9   |  21<L>  |  0.82    Ca    9.2      29 Oct 2021 09:45    TPro  6.9  /  Alb  4.3  /  TBili  0.3  /  DBili  x   /  AST  36  /  ALT  27  /  AlkPhos  61  10-27    LIVER FUNCTIONS - ( 27 Oct 2021 15:42 )  Alb: 4.3 g/dL / Pro: 6.9 g/dL / ALK PHOS: 61 U/L / ALT: 27 U/L / AST: 36 U/L / GGT: x             Urinalysis Basic - ( 27 Oct 2021 19:00 )    Color: Light Yellow / Appearance: Clear / S.018 / pH: x  Gluc: x / Ketone: Negative  / Bili: Negative / Urobili: Negative   Blood: x / Protein: Trace / Nitrite: Negative   Leuk Esterase: Negative / RBC: 1 /hpf / WBC 1 /HPF   Sq Epi: x / Non Sq Epi: 0 /hpf / Bacteria: Negative        < from: CT Head No Cont (10.27.21 @ 16:39) >    EXAM:  CT BRAIN                          PROCEDURE DATE:  10/27/2021    INTERPRETATION:  CT HEAD  HEAD CT    INDICATIONS: syncope, hx of HTN, high chol, CVA, recent cerebellar infarct (history of right PICA infarct 1 week ago) sent in from PCP office where she was today after being DC'd from [place] yesterday, now w syncopal episode. Pt with dementia and unable to provide hx. Son at bedside states she was seated on exam table hooked up to Holter, suddenly felt warm and then went unresponsive looking off to the side, was cool and clammy and unresponsive for about a minute. No incontinence. Pt now at baseline. Has been c/o mild HA and dizziness which has been constant since her recent hospitalization.    TECHNIQUE:    HEAD CT:  Serialaxial images were obtained from the skull base to the vertex without the use of intravenous contrast.    COMPARISON EXAMINATION: None.    FINDINGS:    HEAD CT:    VENTRICLES AND SULCI: Ventricles and sulci are unremarkable for patient age.  INTRA-AXIAL: No intracranial mass, acute hemorrhage, or midline shift is present. There is non-specific decreased attenuation in the white matter likely related to sequelae of microvascular disease. Large area of subacute appearing infarct in nearly the entireright cerebellar hemisphere.  EXTRA-AXIAL: No extra-axial fluid collection is present.  INTRACRANIAL HEMORRHAGE: None.    VISUALIZED SINUSES: No air-fluid levels are identified.  VISUALIZED MASTOIDS: Scattered opacifications in the right mastoid air cells.  CALVARIUM:  Intact.  MISCELLANEOUS:  None.    SOFT TISSUES: Unremarkable.  BONES: Unremarkable.    IMPRESSION:    HEAD CT: Mild volume loss, microvascular disease, no acute hemorrhage or midline shift.  Subacute infarct involving nearly the entire right cerebellar hemisphere. No prior exam available for comparison.    --- End of Report ---    OLIVIER CAMPOS MD; Attending Radiologist  This document has been electronically signed. Oct 27 2021  4:48PM    < end of copied text >    Orthostatic VS    10-28-21 @ 18:02  Lying BP: 151/73 HR: 73   Sitting BP: 139/79 HR: 78  Standing BP: 136/86 HR: 85  Site: upper left arm   Mode: electronic      
Patient is a 89y old  Female who presents with a chief complaint of Syncope (29 Oct 2021 17:16)      SUBJECTIVE / OVERNIGHT EVENTS: Comfortable without new complaints.   Review of Systems  chest pain no  palpitations no  sob no  nausea no  headache no    MEDICATIONS  (STANDING):  aspirin  chewable 81 milliGRAM(s) Oral daily  atorvastatin 40 milliGRAM(s) Oral at bedtime  chlorhexidine 2% Cloths 1 Application(s) Topical daily  clopidogrel Tablet 75 milliGRAM(s) Oral daily  enoxaparin Injectable 40 milliGRAM(s) SubCutaneous daily  levothyroxine 50 MICROGram(s) Oral daily  lisinopril 40 milliGRAM(s) Oral daily    MEDICATIONS  (PRN):  acetaminophen     Tablet .. 650 milliGRAM(s) Oral every 6 hours PRN Temp greater or equal to 38C (100.4F), Mild Pain (1 - 3)  aluminum hydroxide/magnesium hydroxide/simethicone Suspension 30 milliLiter(s) Oral every 4 hours PRN Dyspepsia  labetalol Injectable 10 milliGRAM(s) IV Push every 10 minutes PRN Systolic blood pressure >180  ondansetron Injectable 4 milliGRAM(s) IV Push every 8 hours PRN Nausea and/or Vomiting      Vital Signs Last 24 Hrs  T(C): 36.6 (29 Oct 2021 12:00), Max: 37 (28 Oct 2021 20:39)  T(F): 97.9 (29 Oct 2021 12:00), Max: 98.6 (28 Oct 2021 20:39)  HR: 75 (29 Oct 2021 12:00) (69 - 75)  BP: 145/75 (29 Oct 2021 12:00) (145/75 - 152/76)  BP(mean): --  RR: 18 (29 Oct 2021 12:00) (17 - 18)  SpO2: 97% (29 Oct 2021 12:00) (93% - 97%)    PHYSICAL EXAM:  GENERAL: NAD, well-developed  HEAD:  Atraumatic, Normocephalic  EYES: EOMI, PERRLA, conjunctiva and sclera clear  NECK: Supple, No JVD  CHEST/LUNG: Clear to auscultation bilaterally; No wheeze  HEART: Regular rate and rhythm; No murmurs, rubs, or gallops  ABDOMEN: Soft, Nontender, Nondistended; Bowel sounds present  EXTREMITIES:  2+ Peripheral Pulses, No clubbing, cyanosis, or edema  PSYCH: AAOx3  NEUROLOGY: non-focal  SKIN: No rashes or lesions    LABS:    10-29    140  |  107  |  29<H>  ----------------------------<  108<H>  4.9   |  21<L>  |  0.82    Ca    9.2      29 Oct 2021 09:45      < from: Transthoracic Echocardiogram (10.28.21 @ 07:42) >  Conclusions:  1. Basal septal hypertrophy.  2. Normal left ventricular systolic function. No segmental  wall motion abnormalities.  3. Mild diastolic dysfunction (Stage I).  4. Normal right ventricular size and function.    < end of copied text >  EEG SUMMARY/CLASSIFICATION    Normal EEG in the awake, drowsy states.  _____________________________________________________________  EEG IMPRESSION/CLINICAL CORRELATE    Normal EEG study.  No epileptiform pattern or seizure seen.            RADIOLOGY & ADDITIONAL TESTS:    Imaging Personally Reviewed:    Consultant(s) Notes Reviewed:      Care Discussed with Consultants/Other Providers:  
Patient is a 89y old  Female who presents with a chief complaint of Syncope (28 Oct 2021 11:51)      SUBJECTIVE / OVERNIGHT EVENTS: Comfortable without new complaints.   Review of Systems  chest pain no  palpitations no  sob no  nausea no  headache no    MEDICATIONS  (STANDING):  aspirin  chewable 81 milliGRAM(s) Oral daily  atorvastatin 40 milliGRAM(s) Oral at bedtime  clopidogrel Tablet 75 milliGRAM(s) Oral daily  enoxaparin Injectable 40 milliGRAM(s) SubCutaneous daily  levothyroxine 50 MICROGram(s) Oral daily  lisinopril 40 milliGRAM(s) Oral daily  potassium chloride    Tablet ER 40 milliEquivalent(s) Oral every 4 hours    MEDICATIONS  (PRN):  acetaminophen     Tablet .. 650 milliGRAM(s) Oral every 6 hours PRN Temp greater or equal to 38C (100.4F), Mild Pain (1 - 3)  aluminum hydroxide/magnesium hydroxide/simethicone Suspension 30 milliLiter(s) Oral every 4 hours PRN Dyspepsia  labetalol Injectable 10 milliGRAM(s) IV Push every 10 minutes PRN Systolic blood pressure >180  ondansetron Injectable 4 milliGRAM(s) IV Push every 8 hours PRN Nausea and/or Vomiting      Vital Signs Last 24 Hrs  T(C): 36.6 (28 Oct 2021 11:45), Max: 37.2 (28 Oct 2021 08:19)  T(F): 97.9 (28 Oct 2021 11:45), Max: 98.9 (28 Oct 2021 08:19)  HR: 70 (28 Oct 2021 16:23) (60 - 80)  BP: 130/61 (28 Oct 2021 16:23) (119/68 - 164/61)  BP(mean): --  RR: 18 (28 Oct 2021 11:45) (18 - 18)  SpO2: 96% (28 Oct 2021 11:45) (94% - 99%)    PHYSICAL EXAM:  GENERAL: NAD, well-developed  HEAD:  Atraumatic, Normocephalic  EYES: EOMI, PERRLA, conjunctiva and sclera clear  NECK: Supple, No JVD  CHEST/LUNG: Clear to auscultation bilaterally; No wheeze  HEART: Regular rate and rhythm; No murmurs, rubs, or gallops   ABDOMEN: Soft, Nontender, Nondistended; Bowel sounds present  EXTREMITIES:  2+ Peripheral Pulses, No clubbing, cyanosis, or edema  PSYCH: AAOx3   NEUROLOGY: non-focal  SKIN: No rashes or lesions    LABS:                        13.4   5.90  )-----------( 221      ( 27 Oct 2021 15:42 )             41.5     10-28    143  |  105  |  22  ----------------------------<  101<H>  3.4<L>   |  24  |  0.80    Ca    9.2      28 Oct 2021 06:59    TPro  6.9  /  Alb  4.3  /  TBili  0.3  /  DBili  x   /  AST  36  /  ALT  27  /  AlkPhos  61  10-27          Urinalysis Basic - ( 27 Oct 2021 19:00 )    Color: Light Yellow / Appearance: Clear / S.018 / pH: x  Gluc: x / Ketone: Negative  / Bili: Negative / Urobili: Negative   Blood: x / Protein: Trace / Nitrite: Negative   Leuk Esterase: Negative / RBC: 1 /hpf / WBC 1 /HPF   Sq Epi: x / Non Sq Epi: 0 /hpf / Bacteria: Negative          RADIOLOGY & ADDITIONAL TESTS:    Imaging Personally Reviewed:    Consultant(s) Notes Reviewed:      Care Discussed with Consultants/Other Providers:

## 2021-10-29 NOTE — DISCHARGE NOTE PROVIDER - CARE PROVIDER_API CALL
Javad Mcnulty J  CARDIOVASCULAR DISEASE  [phone] [place] [address]  Phone: [phone]  Fax: [phone]  Established Patient  Follow Up Time: Routine

## 2021-10-29 NOTE — DISCHARGE NOTE PROVIDER - HOSPITAL COURSE
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 Admit for syncope w/u vs seizure activity.    CT head at [facility] 10/27: Subacute infarct involving nearly the entire R cerebellar hemisphere.      Recent workup at [facility]:  - TTE 10/25: mild LV hypertrophy, R atrium normal size, LVEf 60%, trace MR, trace AR, mild TR. Otherwise unremarkable for cause of stroke  - Carotid duplex 10/25: 0-19% stenosis of DENY, LICA. Antegrade flow of R/L verts.   - MR brain: acute R cerebellar infarct in distribution of R PICA territory. No other acute infarct. No intracranial hemorrhage. Does have old lacunar infarcts and chronic small vessel disease. MRA anterior circulation was not well visualized 2/2 motion.   A1c 6.3,   Impression: Patient with recent large subacute infarct of R cerebellar hemisphere presents with acute brief change in responsiveness without any tonic/clonic activity noted or signs of seizure. Can also consider acute brief vertigo or syncope as other potential etiologies as well as seizure No signs of infectious toxic or metabolic causes.    Mechanism: Stroke 2/2 ESUS     EEG 10/29 normal.     Per neuro, recommended to:  - continue on aspirin 81mg PO, Plavix 75mg PO continue for 3 weeks followed by aspirin 81mg alone   - continue on atorvastatin 40mg daily (long-term goal and titrate to LDL < 70)  - consider ILR/extended cardiac monitor for detection of AF    Biotel patch placed on day of discharge.     Stable for discharge and to follow up outpatient with PCP and cardiology.

## 2023-08-21 PROBLEM — I63.9 CEREBRAL INFARCTION, UNSPECIFIED: Chronic | Status: ACTIVE | Noted: 2021-10-27

## 2023-08-21 PROBLEM — E03.9 HYPOTHYROIDISM, UNSPECIFIED: Chronic | Status: ACTIVE | Noted: 2021-10-27

## 2023-08-21 PROBLEM — H26.9 UNSPECIFIED CATARACT: Chronic | Status: ACTIVE | Noted: 2021-10-27

## 2023-08-21 PROBLEM — E78.00 PURE HYPERCHOLESTEROLEMIA, UNSPECIFIED: Chronic | Status: ACTIVE | Noted: 2021-10-27

## 2023-08-21 PROBLEM — I10 ESSENTIAL (PRIMARY) HYPERTENSION: Chronic | Status: ACTIVE | Noted: 2021-10-27

## 2023-09-05 ENCOUNTER — APPOINTMENT (OUTPATIENT)
Dept: UROLOGY | Facility: CLINIC | Age: 88
End: 2023-09-05
Payer: MEDICARE

## 2023-09-05 VITALS
WEIGHT: 115 LBS | HEART RATE: 73 BPM | BODY MASS INDEX: 24.81 KG/M2 | TEMPERATURE: 98.1 F | OXYGEN SATURATION: 97 % | SYSTOLIC BLOOD PRESSURE: 156 MMHG | DIASTOLIC BLOOD PRESSURE: 83 MMHG | HEIGHT: 57 IN

## 2023-09-05 DIAGNOSIS — N39.0 URINARY TRACT INFECTION, SITE NOT SPECIFIED: ICD-10-CM

## 2023-09-05 PROCEDURE — 99204 OFFICE O/P NEW MOD 45 MIN: CPT

## 2023-09-05 RX ORDER — LISINOPRIL 30 MG/1
TABLET ORAL
Refills: 0 | Status: ACTIVE | COMMUNITY

## 2023-09-05 RX ORDER — ATORVASTATIN CALCIUM 40 MG/1
40 TABLET, FILM COATED ORAL
Refills: 0 | Status: ACTIVE | COMMUNITY

## 2023-09-05 RX ORDER — ASPIRIN 81 MG
81 TABLET,CHEWABLE ORAL
Refills: 0 | Status: ACTIVE | COMMUNITY

## 2023-09-05 RX ORDER — LEVOTHYROXINE SODIUM 137 UG/1
TABLET ORAL
Refills: 0 | Status: ACTIVE | COMMUNITY

## 2023-09-05 NOTE — HISTORY OF PRESENT ILLNESS
[FreeTextEntry1] : Very pleasant 89-year-old woman who presents today for evaluation of recent urinary tract infection and urinary incontinence.  She is accompanied by one of her sons as well as another one of her sons by phone.  Her son reports that she has been incontinent recently.  She is unaware of this most of the time.  She was recently diagnosed with a urinary tract infection and treated with antibiotics.  She presents for evaluation of this today.

## 2023-09-05 NOTE — ASSESSMENT
[FreeTextEntry1] : Very pleasant 89-year-old woman who presents for evaluation of recent urinary tract infection, urinary incontinence -We discussed different types of incontinence, including primarily difference between stress and urge incontinence.  She reports no symptoms of stress urinary incontinence -We discussed how dementia may contribute to her urinary symptoms, including incontinence -Urinalysis -Urine culture -Trial of Gemtesa -We discussed the risks and benefits of Gemtesa -Follow-up in 1 month

## 2023-09-07 LAB
APPEARANCE: CLEAR
BACTERIA UR CULT: NORMAL
BACTERIA: NEGATIVE /HPF
BILIRUBIN URINE: NEGATIVE
BLOOD URINE: NEGATIVE
CALCIUM OXALATE CRYSTALS: PRESENT
CAST: 2 /LPF
COLOR: YELLOW
EPITHELIAL CELLS: 2 /HPF
GLUCOSE QUALITATIVE U: NEGATIVE MG/DL
KETONES URINE: NEGATIVE MG/DL
LEUKOCYTE ESTERASE URINE: ABNORMAL
MICROSCOPIC-UA: NORMAL
NITRITE URINE: NEGATIVE
PH URINE: 5.5
PROTEIN URINE: NORMAL MG/DL
RED BLOOD CELLS URINE: 3 /HPF
REVIEW: NORMAL
SPECIFIC GRAVITY URINE: 1.02
UROBILINOGEN URINE: 0.2 MG/DL
WHITE BLOOD CELLS URINE: 9 /HPF

## 2023-10-20 ENCOUNTER — APPOINTMENT (OUTPATIENT)
Dept: UROLOGY | Facility: CLINIC | Age: 88
End: 2023-10-20
Payer: MEDICARE

## 2023-10-20 VITALS
HEART RATE: 63 BPM | DIASTOLIC BLOOD PRESSURE: 86 MMHG | TEMPERATURE: 98 F | BODY MASS INDEX: 21.57 KG/M2 | HEIGHT: 57 IN | SYSTOLIC BLOOD PRESSURE: 155 MMHG | WEIGHT: 100 LBS | OXYGEN SATURATION: 96 %

## 2023-10-20 DIAGNOSIS — N39.41 URGE INCONTINENCE: ICD-10-CM

## 2023-10-20 PROCEDURE — 99214 OFFICE O/P EST MOD 30 MIN: CPT

## 2023-10-20 RX ORDER — MIRABEGRON 50 MG/1
50 TABLET, FILM COATED, EXTENDED RELEASE ORAL
Qty: 30 | Refills: 5 | Status: ACTIVE | COMMUNITY
Start: 2023-10-20 | End: 1900-01-01

## 2023-11-05 ENCOUNTER — RX RENEWAL (OUTPATIENT)
Age: 88
End: 2023-11-05

## 2023-11-05 RX ORDER — VIBEGRON 75 MG/1
75 TABLET, FILM COATED ORAL
Qty: 90 | Refills: 1 | Status: ACTIVE | COMMUNITY
Start: 2023-09-05 | End: 1900-01-01

## 2024-04-23 ENCOUNTER — APPOINTMENT (OUTPATIENT)
Dept: UROLOGY | Facility: CLINIC | Age: 89
End: 2024-04-23
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
